# Patient Record
Sex: MALE | Race: WHITE | NOT HISPANIC OR LATINO | Employment: FULL TIME | URBAN - METROPOLITAN AREA
[De-identification: names, ages, dates, MRNs, and addresses within clinical notes are randomized per-mention and may not be internally consistent; named-entity substitution may affect disease eponyms.]

---

## 2017-04-20 RX ORDER — LISINOPRIL AND HYDROCHLOROTHIAZIDE 20; 25 MG/1; MG/1
1 TABLET ORAL DAILY
COMMUNITY
End: 2017-04-21 | Stop reason: SDUPTHER

## 2017-04-20 RX ORDER — ASPIRIN 81 MG/1
81 TABLET ORAL DAILY
COMMUNITY
End: 2018-04-24 | Stop reason: SDUPTHER

## 2017-04-20 RX ORDER — CITALOPRAM 20 MG/1
20 TABLET, FILM COATED ORAL DAILY
COMMUNITY
End: 2017-04-21 | Stop reason: SDUPTHER

## 2017-04-20 RX ORDER — TESTOSTERONE CYPIONATE 200 MG/ML
120 INJECTION, SOLUTION INTRAMUSCULAR WEEKLY
COMMUNITY
End: 2017-04-21 | Stop reason: HOSPADM

## 2017-04-20 RX ORDER — ATORVASTATIN CALCIUM 40 MG/1
40 TABLET, FILM COATED ORAL NIGHTLY
COMMUNITY
End: 2017-04-21 | Stop reason: SDUPTHER

## 2017-04-20 RX ORDER — ACETAMINOPHEN 500 MG
1 TABLET ORAL DAILY
COMMUNITY
End: 2019-06-21

## 2017-04-20 RX ORDER — METFORMIN HYDROCHLORIDE 500 MG/1
500 TABLET, EXTENDED RELEASE ORAL NIGHTLY
COMMUNITY
End: 2017-04-21 | Stop reason: SDUPTHER

## 2017-04-20 RX ORDER — ATENOLOL AND CHLORTHALIDONE TABLET 50; 25 MG/1; MG/1
1 TABLET ORAL DAILY
COMMUNITY
End: 2017-04-21 | Stop reason: SDUPTHER

## 2017-04-20 RX ORDER — AMLODIPINE BESYLATE 10 MG/1
10 TABLET ORAL DAILY
COMMUNITY
End: 2017-04-21 | Stop reason: SDUPTHER

## 2017-04-21 ENCOUNTER — OFFICE VISIT (OUTPATIENT)
Dept: INTERNAL MEDICINE | Facility: CLINIC | Age: 62
End: 2017-04-21
Payer: COMMERCIAL

## 2017-04-21 ENCOUNTER — LAB VISIT (OUTPATIENT)
Dept: LAB | Facility: HOSPITAL | Age: 62
End: 2017-04-21
Attending: FAMILY MEDICINE
Payer: COMMERCIAL

## 2017-04-21 VITALS
DIASTOLIC BLOOD PRESSURE: 80 MMHG | OXYGEN SATURATION: 96 % | SYSTOLIC BLOOD PRESSURE: 122 MMHG | RESPIRATION RATE: 16 BRPM | BODY MASS INDEX: 34.77 KG/M2 | HEART RATE: 69 BPM | WEIGHT: 285.5 LBS | HEIGHT: 76 IN | TEMPERATURE: 97 F

## 2017-04-21 DIAGNOSIS — Z12.11 SCREENING FOR COLORECTAL CANCER: ICD-10-CM

## 2017-04-21 DIAGNOSIS — E78.2 MIXED DYSLIPIDEMIA: Chronic | ICD-10-CM

## 2017-04-21 DIAGNOSIS — Z11.59 ENCOUNTER FOR HEPATITIS C SCREENING TEST FOR LOW RISK PATIENT: ICD-10-CM

## 2017-04-21 DIAGNOSIS — Z23 NEED FOR SHINGLES VACCINE: ICD-10-CM

## 2017-04-21 DIAGNOSIS — I86.1 SCROTAL VARICES: Chronic | ICD-10-CM

## 2017-04-21 DIAGNOSIS — Z12.12 SCREENING FOR COLORECTAL CANCER: ICD-10-CM

## 2017-04-21 DIAGNOSIS — I10 ESSENTIAL HYPERTENSION, BENIGN: Chronic | ICD-10-CM

## 2017-04-21 DIAGNOSIS — Z23 NEED FOR TDAP VACCINATION: ICD-10-CM

## 2017-04-21 DIAGNOSIS — E66.09: Chronic | ICD-10-CM

## 2017-04-21 DIAGNOSIS — Z12.5 SCREENING FOR PROSTATE CANCER: ICD-10-CM

## 2017-04-21 DIAGNOSIS — Z12.5 SCREENING FOR PROSTATE CANCER: Primary | ICD-10-CM

## 2017-04-21 DIAGNOSIS — R35.1 BPH ASSOCIATED WITH NOCTURIA: Chronic | ICD-10-CM

## 2017-04-21 DIAGNOSIS — F33.41 RECURRENT MAJOR DEPRESSION IN PARTIAL REMISSION: Chronic | ICD-10-CM

## 2017-04-21 DIAGNOSIS — N40.1 BPH ASSOCIATED WITH NOCTURIA: Chronic | ICD-10-CM

## 2017-04-21 DIAGNOSIS — Z79.899 ENCOUNTER FOR MEDICATION MANAGEMENT: Chronic | ICD-10-CM

## 2017-04-21 DIAGNOSIS — R73.03 PREDIABETES: Chronic | ICD-10-CM

## 2017-04-21 DIAGNOSIS — R73.01 IMPAIRED FASTING GLUCOSE: Chronic | ICD-10-CM

## 2017-04-21 LAB
ALBUMIN SERPL BCP-MCNC: 3.9 G/DL
ALP SERPL-CCNC: 61 U/L
ALT SERPL W/O P-5'-P-CCNC: 47 U/L
ANION GAP SERPL CALC-SCNC: 8 MMOL/L
AST SERPL-CCNC: 27 U/L
BILIRUB SERPL-MCNC: 0.8 MG/DL
BUN SERPL-MCNC: 22 MG/DL
CALCIUM SERPL-MCNC: 9.1 MG/DL
CHLORIDE SERPL-SCNC: 103 MMOL/L
CO2 SERPL-SCNC: 29 MMOL/L
COMPLEXED PSA SERPL-MCNC: 0.38 NG/ML
CREAT SERPL-MCNC: 1.1 MG/DL
EST. GFR  (AFRICAN AMERICAN): >60 ML/MIN/1.73 M^2
EST. GFR  (NON AFRICAN AMERICAN): >60 ML/MIN/1.73 M^2
GLUCOSE SERPL-MCNC: 87 MG/DL
POTASSIUM SERPL-SCNC: 3.9 MMOL/L
PROT SERPL-MCNC: 7 G/DL
SODIUM SERPL-SCNC: 140 MMOL/L

## 2017-04-21 PROCEDURE — 3079F DIAST BP 80-89 MM HG: CPT | Mod: S$GLB,,, | Performed by: FAMILY MEDICINE

## 2017-04-21 PROCEDURE — 90715 TDAP VACCINE 7 YRS/> IM: CPT | Mod: S$GLB,,, | Performed by: FAMILY MEDICINE

## 2017-04-21 PROCEDURE — 99204 OFFICE O/P NEW MOD 45 MIN: CPT | Mod: 25,S$GLB,, | Performed by: FAMILY MEDICINE

## 2017-04-21 PROCEDURE — 90472 IMMUNIZATION ADMIN EACH ADD: CPT | Mod: S$GLB,,, | Performed by: FAMILY MEDICINE

## 2017-04-21 PROCEDURE — 1160F RVW MEDS BY RX/DR IN RCRD: CPT | Mod: S$GLB,,, | Performed by: FAMILY MEDICINE

## 2017-04-21 PROCEDURE — 90736 HZV VACCINE LIVE SUBQ: CPT | Mod: S$GLB,,, | Performed by: FAMILY MEDICINE

## 2017-04-21 PROCEDURE — 90471 IMMUNIZATION ADMIN: CPT | Mod: S$GLB,,, | Performed by: FAMILY MEDICINE

## 2017-04-21 PROCEDURE — 86803 HEPATITIS C AB TEST: CPT

## 2017-04-21 PROCEDURE — 36415 COLL VENOUS BLD VENIPUNCTURE: CPT | Mod: PO

## 2017-04-21 PROCEDURE — 3074F SYST BP LT 130 MM HG: CPT | Mod: S$GLB,,, | Performed by: FAMILY MEDICINE

## 2017-04-21 PROCEDURE — 99999 PR PBB SHADOW E&M-EST. PATIENT-LVL IV: CPT | Mod: PBBFAC,,, | Performed by: FAMILY MEDICINE

## 2017-04-21 PROCEDURE — 84153 ASSAY OF PSA TOTAL: CPT

## 2017-04-21 PROCEDURE — 80053 COMPREHEN METABOLIC PANEL: CPT

## 2017-04-21 RX ORDER — METFORMIN HYDROCHLORIDE 500 MG/1
500 TABLET, EXTENDED RELEASE ORAL NIGHTLY
Qty: 90 TABLET | Refills: 3 | Status: SHIPPED | OUTPATIENT
Start: 2017-04-21 | End: 2018-04-24 | Stop reason: SDUPTHER

## 2017-04-21 RX ORDER — CITALOPRAM 20 MG/1
20 TABLET, FILM COATED ORAL DAILY
Qty: 90 TABLET | Refills: 3 | Status: SHIPPED | OUTPATIENT
Start: 2017-04-21 | End: 2018-04-24 | Stop reason: SDUPTHER

## 2017-04-21 RX ORDER — ATORVASTATIN CALCIUM 40 MG/1
40 TABLET, FILM COATED ORAL NIGHTLY
Qty: 90 TABLET | Refills: 3 | Status: SHIPPED | OUTPATIENT
Start: 2017-04-21 | End: 2018-04-24 | Stop reason: SDUPTHER

## 2017-04-21 RX ORDER — LISINOPRIL AND HYDROCHLOROTHIAZIDE 20; 25 MG/1; MG/1
1 TABLET ORAL DAILY
Qty: 90 TABLET | Refills: 3 | Status: SHIPPED | OUTPATIENT
Start: 2017-04-21 | End: 2018-04-24 | Stop reason: SDUPTHER

## 2017-04-21 RX ORDER — ATENOLOL AND CHLORTHALIDONE TABLET 50; 25 MG/1; MG/1
1 TABLET ORAL DAILY
Qty: 90 TABLET | Refills: 3 | Status: SHIPPED | OUTPATIENT
Start: 2017-04-21 | End: 2018-04-24 | Stop reason: ALTCHOICE

## 2017-04-21 RX ORDER — AMLODIPINE BESYLATE 10 MG/1
10 TABLET ORAL DAILY
Qty: 90 TABLET | Refills: 3 | Status: SHIPPED | OUTPATIENT
Start: 2017-04-21 | End: 2018-04-24 | Stop reason: SDUPTHER

## 2017-04-21 NOTE — MR AVS SNAPSHOT
[facility]  [address] [facility] [address] [place] [address]  Phone: [phone]  Fax: [phone]                  Kennedy Cervantes   2017 8:40 AM   Office Visit    Description:  Male : 1955   Provider:  Jayce Patterson MD   Department:  [facility]           Reason for Visit     Hypertension     Medication Refill           Diagnoses this Visit        Comments    Screening for prostate cancer    -  Primary     Essential hypertension, benign         Mixed dyslipidemia         Prediabetes         Impaired fasting glucose         Encounter for medication management         BPH associated with nocturia         Scrotal varices         Recurrent major depression in partial remission         Encounter for hepatitis C screening test for low risk patient         Need for shingles vaccine         Need for Tdap vaccination         Screening for colorectal cancer         Class 1 obesity due to excess calories with serious comorbidity and body mass index (BMI) 34.0 to 34.9 in adult                To Do List           Future Appointments        Provider Department Dept Phone    2017 11:55 AM LABORATORY, [facility] [phone]    2017 3:00 PM MD [facility] [phone]    10/13/2017 8:00 AM Jayce Patterson MD [facility] [phone]      Goals (5 Years of Data)     None      Follow-Up and Disposition     Return in about 6 months (around 10/21/2017).       These Medications        Disp Refills Start End    amlodipine (NORVASC) 10 MG tablet 90 tablet 3 2017     Take 1 tablet (10 mg total) by mouth once daily. - Oral    Pharmacy: [id number] - [place] - [address]  [facility] Ph #: [phone]       atenolol-chlorthalidone (TENORETIC) 50-25 mg Tab 90 tablet 3 2017     Take 1 tablet by mouth once daily. - Oral    Pharmacy: [facility]  [address] [place] [address] [facility] AT [facility] Ph #: [phone]       atorvastatin (LIPITOR) 40 MG tablet 90 tablet 3 4/21/2017     Take 1 tablet (40 mg total) by mouth nightly. - Oral    Pharmacy: [address] [address] [facility] AT [facility] Ph #: [phone]       citalopram (CELEXA) 20 MG tablet 90 tablet 3 4/21/2017     Take 1 tablet (20 mg total) by mouth once daily. - Oral    Pharmacy: [address] [address] [facility] AT [facility] Ph #: [phone]       lisinopril-hydrochlorothiazide (PRINZIDE,ZESTORETIC) 20-25 mg Tab 90 tablet 3 4/21/2017     Take 1 tablet by mouth once daily. - Oral    Pharmacy: [address] [address] [facility] AT [facility] Ph #: [phone]       metformin (GLUCOPHAGE-XR) 500 MG 24 hr tablet 90 tablet 3 4/21/2017     Take 1 tablet (500 mg total) by mouth every evening. - Oral    Pharmacy: [address] [address] [facility] AT [facility] Ph #: [phone]         [facility]     [facility] Nurse Care Line - 24/7 Assistance  Unless otherwise directed by your provider, please contact [facility], our nurse care line that is available for 24/7 assistance.     Registered nurses in the [facility] provide: appointment scheduling, clinical advisement, health education, and other advisory services.  Call: [phone] (toll free)               Medications           Message regarding Medications     Verify the changes and/or additions to your medication regime listed below are the same as discussed with your clinician today.  If any of these changes or additions are incorrect, please notify your healthcare provider.        CHANGE how you are taking these medications     Start Taking Instead of    amlodipine (NORVASC) 10 MG  tablet amlodipine (NORVASC) 10 MG tablet    Dosage:  Take 1 tablet (10 mg total) by mouth once daily. Dosage:  Take 10 mg by mouth once daily.    Reason for Change:  Reorder     atorvastatin (LIPITOR) 40 MG tablet atorvastatin (LIPITOR) 40 MG tablet    Dosage:  Take 1 tablet (40 mg total) by mouth nightly. Dosage:  Take 40 mg by mouth nightly.    Reason for Change:  Reorder     citalopram (CELEXA) 20 MG tablet citalopram (CELEXA) 20 MG tablet    Dosage:  Take 1 tablet (20 mg total) by mouth once daily. Dosage:  Take 20 mg by mouth once daily.    Reason for Change:  Reorder     metformin (GLUCOPHAGE-XR) 500 MG 24 hr tablet metformin (GLUCOPHAGE-XR) 500 MG 24 hr tablet    Dosage:  Take 1 tablet (500 mg total) by mouth every evening. Dosage:  Take 500 mg by mouth every evening.     Reason for Change:  Reorder       STOP taking these medications     testosterone cypionate (DEPOTESTOTERONE CYPIONATE) 200 mg/mL injection Inject 120 mg into the muscle once a week.           Verify that the below list of medications is an accurate representation of the medications you are currently taking.  If none reported, the list may be blank. If incorrect, please contact your healthcare provider. Carry this list with you in case of emergency.           Current Medications     amlodipine (NORVASC) 10 MG tablet Take 1 tablet (10 mg total) by mouth once daily.    aspirin (ECOTRIN) 81 MG EC tablet Take 81 mg by mouth once daily.    atenolol-chlorthalidone (TENORETIC) 50-25 mg Tab Take 1 tablet by mouth once daily.    atorvastatin (LIPITOR) 40 MG tablet Take 1 tablet (40 mg total) by mouth nightly.    cholecalciferol, vitamin D3, (VITAMIN D3) 2,000 unit Cap Take 1 capsule by mouth once daily.    citalopram (CELEXA) 20 MG tablet Take 1 tablet (20 mg total) by mouth once daily.    lisinopril-hydrochlorothiazide (PRINZIDE,ZESTORETIC) 20-25 mg Tab Take 1 tablet by mouth once daily.    metformin (GLUCOPHAGE-XR) 500 MG 24 hr tablet Take 1 tablet  "(500 mg total) by mouth every evening.    MV,MINERALS/FA/LYCOPENE/GINKGO (DAILY MULTIPLE FOR MEN 50+ ORAL) Take 1 tablet by mouth once daily.    omega-3 fatty acids-vitamin E (FISH OIL) 1,000 mg Cap Take 1 capsule by mouth once daily.           Clinical Reference Information           Your Vitals Were     BP Pulse Temp Resp Height Weight    122/80 69 97.3 °F (36.3 °C) (Tympanic) 16 6' 4.3" (1.938 m) 129.5 kg (285 lb 7.9 oz)    SpO2 BMI             96% 34.48 kg/m2         Blood Pressure          Most Recent Value    BP  122/80      Allergies as of 4/21/2017     No Known Allergies      Immunizations Administered on Date of Encounter - 4/21/2017     Name Date Dose VIS Date Route    TDAP  Incomplete 0.5 mL 2/24/2015 Intramuscular    Zoster  Incomplete 0.65 mL 10/6/2009 Subcutaneous      Orders Placed During Today's Visit      Normal Orders This Visit    Case request GI: COLONOSCOPY     Tdap Vaccine     Zoster Vaccine - Live     Future Labs/Procedures Expected by Expires    Comprehensive metabolic panel  4/21/2017 6/20/2018    Hepatitis C antibody  4/21/2017 6/20/2018    PSA, Screening  4/21/2017 6/20/2018      [facility] Sign-Up     Activating your [facility] account is as easy as 1-2-3!     1) Visit [email or web address], select Sign Up Now, enter this activation code and your date of birth, then select Next.  [email or web address]  Expires: 6/5/2017  9:30 AM      2) Create a username and password to use when you visit [facility] in the future and select a security question in case you lose your password and select Next.    3) Enter your e-mail address and click Sign Up!    Additional Information  If you have questions, please e-mail [email or web address] or call [phone] to talk to our [facility] staff. Remember, [facility] is NOT to be used for urgent needs. For medical emergencies, dial 911.         Language Assistance Services     ATTENTION: Language assistance services are available, free of charge. Please call " [phone].      ATENCIÓN: Si habla español, tiene a escamilla disposición servicios gratuitos de asistencia lingüística. Llame al [phone].     CHÚ Ý: N?u b?n nói Ti?ng Vi?t, có các d?ch v? h? tr? ngôn ng? mi?n phí dành cho b?n. G?i s? [phone].         [facility] complies with applicable Federal civil rights laws and does not discriminate on the basis of race, color, national origin, age, disability, or sex.

## 2017-04-21 NOTE — ASSESSMENT & PLAN NOTE
He appears to be tolerating his current medications well and reports preceiving no adverse side-effects.

## 2017-04-21 NOTE — ASSESSMENT & PLAN NOTE
Based on the report of the patient and information available to me at present, this condition appears to be STABLE/COMPENSATED.

## 2017-04-21 NOTE — ASSESSMENT & PLAN NOTE
This condition appears MILD to MODERATE, with minimal WORSENING over the last couple of years area and he has follow-up with his urologist within the next couple of months.

## 2017-04-21 NOTE — ASSESSMENT & PLAN NOTE
Appears to be tolerating statin for dyslipidemia without apparent myositis or hepatic dysfunction.

## 2017-04-21 NOTE — PROGRESS NOTES
NOTE: Documentation entered by me for this encounter was done in part using speech-recognition technology. Although I have made an effort to ensure accuracy, malapropisms may exist and should be interpreted in context. LEODAN Patterson MD    Patient ID: Kennedy Cervantes is a 61 y.o. male.    CHIEF COMPLAINT   Hypertension and Medication Refill        HISTORY OF PRESENT ILLNESS:   Essential hypertension, benign  Based on the report of the patient and information available to me at present, this condition appears to be WELL CONTROLLED, and this condition appears to be STABLE.     Mixed dyslipidemia  Appears to be tolerating statin for dyslipidemia without apparent myositis or hepatic dysfunction.    Prediabetes  This appears clinically asymptomatic. He is tolerating metformin without adverse side effect.    Encounter for medication management  He appears to be tolerating his current medications well and reports preceiving no adverse side-effects.     BPH associated with nocturia  This condition appears MILD to MODERATE, with minimal WORSENING over the last couple of years area and he has follow-up with his urologist within the next couple of months.    Scrotal varices  Based on the report of the patient and information available to me at present, this condition appears to be STABLE/COMPENSATED.     Recurrent major depression in partial remission  Based on the report of the patient and information available to me at present, this condition appears to be WELL CONTROLLED, and this condition appears to be STABLE.     Class 1 obesity due to excess calories with serious comorbidity and body mass index (BMI) 34.0 to 34.9 in adult  He has made sincere efforts at therapeutic lifestyle changes and has lost weight intentionally.        REVIEW OF SYSTEMS:   CONSTITUTIONAL: No fever reported.  CARDIOVASCULAR: No chest pain reported.  PULMONARY: No trouble breathing reported.    PHYSICAL EXAM:   CONSTITUTIONAL: Vital signs  noted. No apparent distress. Does not appear acutely ill or septic. Appears adequately hydrated.  HEENT: Normocephalic. Atraumatic. External ears unremarkable. Oropharynx moist.  PULM: Lungs clear. Breathing unlabored.  CV: Heart regular.  DERM: Warm and moist.  PSYCHIATRIC: Alert and oriented x 3. Mood is grossly neutral. Affect appropriate. Judgment and insight not grossly compromised.    ASSESSMENT:       1. Screening for prostate cancer    2. Essential hypertension, benign    3. Mixed dyslipidemia    4. Prediabetes    5. Impaired fasting glucose    6. Encounter for medication management    7. BPH associated with nocturia    8. Scrotal varices    9. Recurrent major depression in partial remission    10. Encounter for hepatitis C screening test for low risk patient    11. Need for shingles vaccine    12. Need for Tdap vaccination    13. Screening for colorectal cancer    14. Class 1 obesity due to excess calories with serious comorbidity and body mass index (BMI) 34.0 to 34.9 in adult             PLAN:   Screening for prostate cancer  -     PSA, Screening; Future; Expected date: 4/21/17    Essential hypertension, benign  -     Comprehensive metabolic panel; Future; Expected date: 4/21/17  -     amlodipine (NORVASC) 10 MG tablet; Take 1 tablet (10 mg total) by mouth once daily.  Dispense: 90 tablet; Refill: 3  -     atenolol-chlorthalidone (TENORETIC) 50-25 mg Tab; Take 1 tablet by mouth once daily.  Dispense: 90 tablet; Refill: 3  -     lisinopril-hydrochlorothiazide (PRINZIDE,ZESTORETIC) 20-25 mg Tab; Take 1 tablet by mouth once daily.  Dispense: 90 tablet; Refill: 3    Mixed dyslipidemia  -     atorvastatin (LIPITOR) 40 MG tablet; Take 1 tablet (40 mg total) by mouth nightly.  Dispense: 90 tablet; Refill: 3    Prediabetes  -     metformin (GLUCOPHAGE-XR) 500 MG 24 hr tablet; Take 1 tablet (500 mg total) by mouth every evening.  Dispense: 90 tablet; Refill: 3    Impaired fasting glucose    Encounter for  medication management  -     Comprehensive metabolic panel; Future; Expected date: 4/21/17    BPH associated with nocturia  -     PSA, Screening; Future; Expected date: 4/21/17    Scrotal varices    Recurrent major depression in partial remission  -     citalopram (CELEXA) 20 MG tablet; Take 1 tablet (20 mg total) by mouth once daily.  Dispense: 90 tablet; Refill: 3    Encounter for hepatitis C screening test for low risk patient  -     Hepatitis C antibody; Future; Expected date: 4/21/17    Need for shingles vaccine  -     Zoster Vaccine - Live    Need for Tdap vaccination  -     Tdap Vaccine    Screening for colorectal cancer  -     Case request GI: COLONOSCOPY    Class 1 obesity due to excess calories with serious comorbidity and body mass index (BMI) 34.0 to 34.9 in adult        Medications Discontinued During This Encounter   Medication Reason    testosterone cypionate (DEPOTESTOTERONE CYPIONATE) 200 mg/mL injection Patient Discharge    amlodipine (NORVASC) 10 MG tablet Reorder    atenolol-chlorthalidone (TENORETIC) 50-25 mg Tab Reorder    atorvastatin (LIPITOR) 40 MG tablet Reorder    citalopram (CELEXA) 20 MG tablet Reorder    lisinopril-hydrochlorothiazide (PRINZIDE,ZESTORETIC) 20-25 mg Tab Reorder    metformin (GLUCOPHAGE-XR) 500 MG 24 hr tablet Reorder

## 2017-04-25 LAB — HCV AB SERPL QL IA: NEGATIVE

## 2017-05-23 ENCOUNTER — OFFICE VISIT (OUTPATIENT)
Dept: UROLOGY | Facility: CLINIC | Age: 62
End: 2017-05-23
Payer: COMMERCIAL

## 2017-05-23 VITALS — BODY MASS INDEX: 34.42 KG/M2 | WEIGHT: 285 LBS

## 2017-05-23 DIAGNOSIS — Z12.5 PROSTATE CANCER SCREENING: Primary | ICD-10-CM

## 2017-05-23 DIAGNOSIS — N50.89 TESTICULAR MASS: ICD-10-CM

## 2017-05-23 LAB
BILIRUB SERPL-MCNC: NORMAL MG/DL
BLOOD URINE, POC: NORMAL
COLOR, POC UA: NORMAL
GLUCOSE UR QL STRIP: NORMAL
KETONES UR QL STRIP: NORMAL
LEUKOCYTE ESTERASE URINE, POC: NORMAL
NITRITE, POC UA: NORMAL
PH, POC UA: 6
PROTEIN, POC: NORMAL
SPECIFIC GRAVITY, POC UA: 1.01
UROBILINOGEN, POC UA: NORMAL

## 2017-05-23 PROCEDURE — 99244 OFF/OP CNSLTJ NEW/EST MOD 40: CPT | Mod: 25,S$GLB,, | Performed by: UROLOGY

## 2017-05-23 PROCEDURE — 81002 URINALYSIS NONAUTO W/O SCOPE: CPT | Mod: S$GLB,,, | Performed by: UROLOGY

## 2017-05-23 PROCEDURE — 99999 PR PBB SHADOW E&M-EST. PATIENT-LVL II: CPT | Mod: PBBFAC,,, | Performed by: UROLOGY

## 2017-05-23 NOTE — LETTER
May 23, 2017      VEE Patterson MD  [address] [facility] [address]           [facility]  [address] [place] [address]  Phone: [phone]  Fax: [phone]          Patient: Kennedy Cervantes   MR Number: [id number]   YOB: 1955   Date of Visit: 5/23/2017       Dear Dr. VEE Patterson:    Thank you for referring Kennedy Cervantes to me for evaluation. Attached you will find relevant portions of my assessment and plan of care.    If you have questions, please do not hesitate to call me. I look forward to following Kennedy Cervantes along with you.    Sincerely,    Boni Caballero IV, MD    Enclosure  CC:  No Recipients    If you would like to receive this communication electronically, please contact [email or web address] or [phone] to request more information on [facility] access.    For providers and/or their staff who would like to refer a patient to [facility], please contact us through our one-stop-shop provider referral line, [facility] , at [phone].    If you feel you have received this communication in error or would no longer like to receive these types of communications, please e-mail [email or web address]

## 2017-05-23 NOTE — PROGRESS NOTES
Chief Complaint: Peyronie's Disease    HPI:   5/23/17: 62 yo man a few months ago had the veins on his penis swell up, had a lot of tenderness that lasted 2-3 weeks and left is penis with a mild crook to the left but maybe that's the same as ever.  None of this was assoiciated with sexual activity.  No abd/pelvic pain and no exac/rel factors.  No hematuria.  No urolithiasis.  No sig urinary bother.  Mild nocturia. Stream a little weak.   No recent  history.  Normal sexual function.  Things pretty normal now.  Referred by Dr. Patterson.     Allergies:  Review of patient's allergies indicates no known allergies.    Medications:  has a current medication list which includes the following prescription(s): amlodipine, aspirin, atenolol-chlorthalidone, atorvastatin, cholecalciferol (vitamin d3), citalopram, lisinopril-hydrochlorothiazide, metformin, mv,minerals/fa/lycopene/ginkgo, and omega-3 fatty acids-vitamin e.    Review of Systems:  General: No fever, chills, fatigability, or weight loss.  Skin: No rashes, itching, or changes in color or texture of skin.  Chest: Denies WHITAKER, cyanosis, wheezing, cough, and sputum production.  Abdomen: Appetite fine. No weight loss. Denies diarrhea, abdominal pain, hematemesis, or blood in stool.  Musculoskeletal: No joint stiffness or swelling. Denies back pain.  : As above.  All other review of systems negative.    PMH:   has a past medical history of Depression; Enlarged prostate without lower urinary tract symptoms (luts); Hyperlipidemia; Hypertension; Impaired fasting glucose; Obesity; Scrotal varices; and Testicular hypofunction.    PSH:   has a past surgical history that includes Spinal cord decompression and Colonoscopy.    FamHx: family history includes Dementia in his mother; Hypothyroidism in his mother.    SocHx:  reports that he has quit smoking. His smoking use included Cigarettes. He has a 10.00 pack-year smoking history. He has never used smokeless tobacco. He  reports that he drinks alcohol. He reports that he does not use drugs.      Physical Exam:  There were no vitals filed for this visit.  General: A&Ox3, no apparent distress, no deformities  Neck: No masses, normal thyroid  Lungs: normal inspiration, no use of accessory muscles  Heart: normal pulse, no arrhythmias  Abdomen: Soft, NT, ND, no masses, no hernias, no hepatosplenomegaly  Lymphatic: Neck and groin nodes negative  Skin: The skin is warm and dry. No jaundice.  Ext: No c/c/e.  : Test desc yaquelin, no abnormalities of epididymus on left. Right large testicle with mass that does not transilluminate. Penis normal, with normal penile and scrotal skin except for a very prominent 2 cm area of varicose veins right side of penis. Meatus normal. Normal rectal tone, no hemorrhoids. Prost 30 gm no nodules or masses appreciated. SV not palpable. Perineum and anus normal.    Labs/Studies:   Urinalysis performed in clinic, summary: UA normal  PSA    4/17: 0.38    Impression/Plan:   1. Low risk of prostate cancer.    2. Scrotal US to eval right testicle.  He says it was checked out 15 years ago and was benign then.  Hopefully it still is.   3. Penile veins have a sig varicosity - likely had a clot in one that cleared similar to a hemorrhoid flareup.  Conservative management.

## 2017-05-30 ENCOUNTER — TELEPHONE (OUTPATIENT)
Dept: RADIOLOGY | Facility: HOSPITAL | Age: 62
End: 2017-05-30

## 2017-05-31 ENCOUNTER — HOSPITAL ENCOUNTER (OUTPATIENT)
Dept: RADIOLOGY | Facility: HOSPITAL | Age: 62
Discharge: HOME | End: 2017-05-31
Attending: UROLOGY
Payer: COMMERCIAL

## 2017-05-31 ENCOUNTER — EXTERNAL RECORD (OUTPATIENT)
Dept: OTHER | Age: 62
End: 2017-05-31

## 2017-05-31 DIAGNOSIS — Z12.5 PROSTATE CANCER SCREENING: ICD-10-CM

## 2017-05-31 DIAGNOSIS — N50.89 TESTICULAR MASS: ICD-10-CM

## 2017-05-31 PROCEDURE — 76870 US EXAM SCROTUM: CPT | Mod: 26,,, | Performed by: RADIOLOGY

## 2017-05-31 PROCEDURE — 76870 US EXAM SCROTUM: CPT | Mod: TC,PO

## 2017-06-26 ENCOUNTER — OFFICE VISIT (OUTPATIENT)
Dept: UROLOGY | Facility: CLINIC | Age: 62
End: 2017-06-26
Payer: COMMERCIAL

## 2017-06-26 VITALS
SYSTOLIC BLOOD PRESSURE: 138 MMHG | DIASTOLIC BLOOD PRESSURE: 92 MMHG | HEART RATE: 68 BPM | WEIGHT: 289 LBS | BODY MASS INDEX: 34.9 KG/M2

## 2017-06-26 DIAGNOSIS — Z12.5 PROSTATE CANCER SCREENING: Primary | ICD-10-CM

## 2017-06-26 DIAGNOSIS — I86.1 VARICOSE VEIN OF SCROTUM: ICD-10-CM

## 2017-06-26 LAB
BILIRUB SERPL-MCNC: NORMAL MG/DL
BLOOD URINE, POC: NORMAL
COLOR, POC UA: YELLOW
GLUCOSE UR QL STRIP: NORMAL
KETONES UR QL STRIP: NORMAL
LEUKOCYTE ESTERASE URINE, POC: NORMAL
NITRITE, POC UA: NORMAL
PH, POC UA: 7
PROTEIN, POC: NORMAL
SPECIFIC GRAVITY, POC UA: 1
UROBILINOGEN, POC UA: NORMAL

## 2017-06-26 PROCEDURE — 81002 URINALYSIS NONAUTO W/O SCOPE: CPT | Mod: S$GLB,,, | Performed by: UROLOGY

## 2017-06-26 PROCEDURE — 99999 PR PBB SHADOW E&M-EST. PATIENT-LVL II: CPT | Mod: PBBFAC,,, | Performed by: UROLOGY

## 2017-06-26 PROCEDURE — 99214 OFFICE O/P EST MOD 30 MIN: CPT | Mod: 25,S$GLB,, | Performed by: UROLOGY

## 2017-06-26 NOTE — PROGRESS NOTES
Chief Complaint: Peyronie's Disease    HPI:   6/26/17: doing well reviewed history.  Scrotal US reassuring.  5/23/17: 60 yo man a few months ago had the veins on his penis swell up, had a lot of tenderness that lasted 2-3 weeks and left is penis with a mild crook to the left but maybe that's the same as ever.  None of this was assoiciated with sexual activity.  No abd/pelvic pain and no exac/rel factors.  No hematuria.  No urolithiasis.  No sig urinary bother.  Mild nocturia. Stream a little weak.   No recent  history.  Normal sexual function.  Things pretty normal now.  Referred by Dr. Patterson.     Allergies:  Review of patient's allergies indicates no known allergies.    Medications:  has a current medication list which includes the following prescription(s): amlodipine, aspirin, atenolol-chlorthalidone, atorvastatin, cholecalciferol (vitamin d3), citalopram, lisinopril-hydrochlorothiazide, metformin, mv-mins/folic/lycopene/ginkgo, and omega-3 fatty acids-vitamin e.    Review of Systems:  General: No fever, chills, fatigability, or weight loss.  Skin: No rashes, itching, or changes in color or texture of skin.  Chest: Denies WHITAKER, cyanosis, wheezing, cough, and sputum production.  Abdomen: Appetite fine. No weight loss. Denies diarrhea, abdominal pain, hematemesis, or blood in stool.  Musculoskeletal: No joint stiffness or swelling. Denies back pain.  : As above.  All other review of systems negative.    PMH:   has a past medical history of Depression; Enlarged prostate without lower urinary tract symptoms (luts); Hyperlipidemia; Hypertension; Impaired fasting glucose; Obesity; Scrotal varices; and Testicular hypofunction.    PSH:   has a past surgical history that includes Spinal cord decompression; Colonoscopy; and anterior laminectomy.    FamHx: family history includes Dementia in his mother; Hypothyroidism in his mother.    SocHx:  reports that he has quit smoking. His smoking use included Cigarettes. He  has a 10.00 pack-year smoking history. He has never used smokeless tobacco. He reports that he drinks alcohol. He reports that he does not use drugs.      Physical Exam:  Vitals:    06/26/17 0903   BP: (!) 138/92   Pulse: 68     General: A&Ox3, no apparent distress, no deformities  Neck: No masses, normal thyroid  Lungs: normal inspiration, no use of accessory muscles  Heart: normal pulse, no arrhythmias  Abdomen: Soft, NT, ND, no masses, no hernias, no hepatosplenomegaly  Lymphatic: Neck and groin nodes negative  Skin: The skin is warm and dry. No jaundice.  Ext: No c/c/e.  :   5/23/17: Test desc yaquelin, no abnormalities of epididymus on left. Right large testicle with mass that does not transilluminate. Penis normal, with normal penile and scrotal skin except for a very prominent 2 cm area of varicose veins right side of penis. Meatus normal. Normal rectal tone, no hemorrhoids. Prost 30 gm no nodules or masses appreciated. SV not palpable. Perineum and anus normal.    Labs/Studies:   Urinalysis performed in clinic, summary: UA normal  PSA    4/17: 0.38    Impression/Plan:   1. Low risk of prostate cancer.    2. Scrotal US to eval right testicle normal.  He says it was checked out 15 years ago and was benign then.  It still is.   3. Penile veins have a sig varicosity - likely had a clot in one that cleared similar to a hemorrhoid flareup.  Conservative management.

## 2017-08-18 ENCOUNTER — TELEPHONE (OUTPATIENT)
Dept: INTERNAL MEDICINE | Facility: CLINIC | Age: 62
End: 2017-08-18

## 2017-10-02 ENCOUNTER — PATIENT OUTREACH (OUTPATIENT)
Dept: ADMINISTRATIVE | Facility: HOSPITAL | Age: 62
End: 2017-10-02

## 2017-10-02 NOTE — LETTER
October 2, 2017    Kennedy Cervantes  [address] [facility] [address]             [facility]  [address]  [facility] [address]  Phone: [phone] Dear Mr. Cervantes:    [facility] is committed to your overall health.  To help you get the most out of each of your visits, we will review your information to make sure you are up to date on all of your recommended tests and/or procedures.      RODERICK Patterson MD has found that you may be due for    Colonoscopy     Influenza Vaccine         If you have had any of the above done at another facility, please bring the records or information with you so that your record at [facility] will be complete.    If you are currently taking medication, please bring it with you to your appointment for review.    We will be happy to assist you with scheduling any necessary appointments or you may contact the [facility] appointment desk at [phone] to schedule at your convenience.     Thank you for choosing [facility] for your healthcare needs,      NEY Perez  Care Coordination Department  [facility]  [phone]

## 2018-02-16 ENCOUNTER — DOCUMENTATION ONLY (OUTPATIENT)
Dept: ENDOSCOPY | Facility: HOSPITAL | Age: 63
End: 2018-02-16

## 2018-04-12 DIAGNOSIS — R73.03 PREDIABETES: Chronic | ICD-10-CM

## 2018-04-12 DIAGNOSIS — E78.2 MIXED DYSLIPIDEMIA: Chronic | ICD-10-CM

## 2018-04-12 DIAGNOSIS — I10 ESSENTIAL HYPERTENSION, BENIGN: Chronic | ICD-10-CM

## 2018-04-16 RX ORDER — ATORVASTATIN CALCIUM 40 MG/1
TABLET, FILM COATED ORAL
Qty: 90 TABLET | Refills: 0 | OUTPATIENT
Start: 2018-04-16

## 2018-04-16 RX ORDER — AMLODIPINE BESYLATE 10 MG/1
TABLET ORAL
Qty: 90 TABLET | Refills: 0 | OUTPATIENT
Start: 2018-04-16

## 2018-04-16 RX ORDER — ATENOLOL AND CHLORTHALIDONE TABLET 50; 25 MG/1; MG/1
TABLET ORAL
Qty: 90 TABLET | Refills: 0 | OUTPATIENT
Start: 2018-04-16

## 2018-04-16 RX ORDER — LISINOPRIL AND HYDROCHLOROTHIAZIDE 20; 25 MG/1; MG/1
TABLET ORAL
Qty: 90 TABLET | Refills: 0 | OUTPATIENT
Start: 2018-04-16

## 2018-04-16 RX ORDER — METFORMIN HYDROCHLORIDE 500 MG/1
TABLET, EXTENDED RELEASE ORAL
Qty: 90 TABLET | Refills: 0 | OUTPATIENT
Start: 2018-04-16

## 2018-04-24 ENCOUNTER — OFFICE VISIT (OUTPATIENT)
Dept: INTERNAL MEDICINE | Facility: CLINIC | Age: 63
End: 2018-04-24
Payer: COMMERCIAL

## 2018-04-24 ENCOUNTER — LAB VISIT (OUTPATIENT)
Dept: LAB | Facility: HOSPITAL | Age: 63
End: 2018-04-24
Attending: FAMILY MEDICINE
Payer: COMMERCIAL

## 2018-04-24 VITALS
TEMPERATURE: 97 F | DIASTOLIC BLOOD PRESSURE: 88 MMHG | WEIGHT: 294.13 LBS | SYSTOLIC BLOOD PRESSURE: 138 MMHG | BODY MASS INDEX: 35.82 KG/M2 | HEIGHT: 76 IN

## 2018-04-24 DIAGNOSIS — R73.03 PREDIABETES: Chronic | ICD-10-CM

## 2018-04-24 DIAGNOSIS — R73.09 ABNORMAL GLUCOSE: ICD-10-CM

## 2018-04-24 DIAGNOSIS — R35.1 BPH ASSOCIATED WITH NOCTURIA: Chronic | ICD-10-CM

## 2018-04-24 DIAGNOSIS — E78.2 MIXED DYSLIPIDEMIA: Chronic | ICD-10-CM

## 2018-04-24 DIAGNOSIS — I10 ESSENTIAL HYPERTENSION, BENIGN: Chronic | ICD-10-CM

## 2018-04-24 DIAGNOSIS — N40.1 BPH ASSOCIATED WITH NOCTURIA: Chronic | ICD-10-CM

## 2018-04-24 DIAGNOSIS — F33.41 RECURRENT MAJOR DEPRESSION IN PARTIAL REMISSION: Chronic | ICD-10-CM

## 2018-04-24 DIAGNOSIS — I10 ESSENTIAL HYPERTENSION, BENIGN: Primary | Chronic | ICD-10-CM

## 2018-04-24 DIAGNOSIS — R73.01 IMPAIRED FASTING GLUCOSE: Chronic | ICD-10-CM

## 2018-04-24 DIAGNOSIS — H90.3 SENSORINEURAL HEARING LOSS (SNHL) OF BOTH EARS: Chronic | ICD-10-CM

## 2018-04-24 DIAGNOSIS — Z12.12 SCREENING FOR COLORECTAL CANCER: ICD-10-CM

## 2018-04-24 DIAGNOSIS — Z12.11 SCREENING FOR COLORECTAL CANCER: ICD-10-CM

## 2018-04-24 PROBLEM — E66.01 CLASS 2 SEVERE OBESITY DUE TO EXCESS CALORIES WITH SERIOUS COMORBIDITY AND BODY MASS INDEX (BMI) OF 35.0 TO 35.9 IN ADULT: Chronic | Status: ACTIVE | Noted: 2017-04-21

## 2018-04-24 LAB
ALBUMIN SERPL BCP-MCNC: 4.1 G/DL
ALP SERPL-CCNC: 75 U/L
ALT SERPL W/O P-5'-P-CCNC: 34 U/L
ANION GAP SERPL CALC-SCNC: 11 MMOL/L
AST SERPL-CCNC: 24 U/L
BILIRUB SERPL-MCNC: 0.9 MG/DL
BUN SERPL-MCNC: 14 MG/DL
CALCIUM SERPL-MCNC: 9.4 MG/DL
CHLORIDE SERPL-SCNC: 99 MMOL/L
CHOLEST SERPL-MCNC: 196 MG/DL
CHOLEST/HDLC SERPL: 3.4 {RATIO}
CO2 SERPL-SCNC: 28 MMOL/L
CREAT SERPL-MCNC: 1.1 MG/DL
EST. GFR  (AFRICAN AMERICAN): >60 ML/MIN/1.73 M^2
EST. GFR  (NON AFRICAN AMERICAN): >60 ML/MIN/1.73 M^2
ESTIMATED AVG GLUCOSE: 105 MG/DL
GLUCOSE SERPL-MCNC: 94 MG/DL
HBA1C MFR BLD HPLC: 5.3 %
HDLC SERPL-MCNC: 57 MG/DL
HDLC SERPL: 29.1 %
LDLC SERPL CALC-MCNC: 117.4 MG/DL
NONHDLC SERPL-MCNC: 139 MG/DL
POTASSIUM SERPL-SCNC: 4 MMOL/L
PROT SERPL-MCNC: 7.5 G/DL
SODIUM SERPL-SCNC: 138 MMOL/L
TRIGL SERPL-MCNC: 108 MG/DL

## 2018-04-24 PROCEDURE — 80061 LIPID PANEL: CPT

## 2018-04-24 PROCEDURE — 36415 COLL VENOUS BLD VENIPUNCTURE: CPT | Mod: PO

## 2018-04-24 PROCEDURE — 83036 HEMOGLOBIN GLYCOSYLATED A1C: CPT

## 2018-04-24 PROCEDURE — 99999 PR PBB SHADOW E&M-EST. PATIENT-LVL III: CPT | Mod: PBBFAC,,, | Performed by: FAMILY MEDICINE

## 2018-04-24 PROCEDURE — 99214 OFFICE O/P EST MOD 30 MIN: CPT | Mod: S$GLB,,, | Performed by: FAMILY MEDICINE

## 2018-04-24 PROCEDURE — 80053 COMPREHEN METABOLIC PANEL: CPT

## 2018-04-24 RX ORDER — SODIUM, POTASSIUM,MAG SULFATES 17.5-3.13G
SOLUTION, RECONSTITUTED, ORAL ORAL
Qty: 354 ML | Refills: 0 | Status: ON HOLD | OUTPATIENT
Start: 2018-04-24 | End: 2018-06-07 | Stop reason: CLARIF

## 2018-04-24 RX ORDER — AMLODIPINE BESYLATE 10 MG/1
10 TABLET ORAL DAILY
Qty: 90 TABLET | Refills: 3 | Status: SHIPPED | OUTPATIENT
Start: 2018-04-24 | End: 2018-10-30 | Stop reason: SDUPTHER

## 2018-04-24 RX ORDER — ASPIRIN 81 MG/1
81 TABLET ORAL DAILY
Qty: 90 TABLET | Refills: 3 | Status: SHIPPED | OUTPATIENT
Start: 2018-04-24 | End: 2019-06-21

## 2018-04-24 RX ORDER — CITALOPRAM 20 MG/1
20 TABLET, FILM COATED ORAL DAILY
Qty: 90 TABLET | Refills: 3 | Status: SHIPPED | OUTPATIENT
Start: 2018-04-24 | End: 2019-02-04 | Stop reason: SDUPTHER

## 2018-04-24 RX ORDER — LISINOPRIL AND HYDROCHLOROTHIAZIDE 20; 25 MG/1; MG/1
1 TABLET ORAL DAILY
Qty: 90 TABLET | Refills: 3 | Status: SHIPPED | OUTPATIENT
Start: 2018-04-24 | End: 2018-05-25 | Stop reason: DRUGHIGH

## 2018-04-24 RX ORDER — METOPROLOL SUCCINATE 100 MG/1
100 TABLET, EXTENDED RELEASE ORAL DAILY
Qty: 90 TABLET | Refills: 3 | Status: SHIPPED | OUTPATIENT
Start: 2018-04-24 | End: 2018-10-30 | Stop reason: SDUPTHER

## 2018-04-24 RX ORDER — CITALOPRAM 20 MG/1
TABLET, FILM COATED ORAL
Qty: 90 TABLET | Refills: 0 | Status: CANCELLED | OUTPATIENT
Start: 2018-04-24

## 2018-04-24 RX ORDER — METFORMIN HYDROCHLORIDE 500 MG/1
500 TABLET, EXTENDED RELEASE ORAL NIGHTLY
Qty: 90 TABLET | Refills: 3 | Status: SHIPPED | OUTPATIENT
Start: 2018-04-24 | End: 2019-03-28 | Stop reason: SDUPTHER

## 2018-04-24 RX ORDER — ATORVASTATIN CALCIUM 40 MG/1
40 TABLET, FILM COATED ORAL NIGHTLY
Qty: 90 TABLET | Refills: 3 | Status: SHIPPED | OUTPATIENT
Start: 2018-04-24 | End: 2019-03-28 | Stop reason: SDUPTHER

## 2018-04-24 NOTE — PROGRESS NOTES
Patient answers are not available for this visit.    CHIEF COMPLAINT  Hypertension      HISTORY OF PRESENT ILLNESS      PROBLEM/CONDITION: Hypertension is asymptomatic, but with suboptimal control. We discussed risks and benefits of treatment options. In addition to therapeutic lifestyle changes, it was agreed to switch him from atenolol/chlorthalidone to more selective beta-blocker metoprolol and enroll him in the digital hypertension monitoring program. Ab is good.    PROBLEM/CONDITION: He is tolerating metformin for his impaired fasting glucose/prediabetes.    PROBLEM/CONDITION: Depression appears well-controlled. No raj, hypomania, or suicidal ideations reported.    PROBLEM/CONDITION: He follows with urologist Dr. Caballero for his BPH, which appears stable.    PROBLEM/CONDITION: NEW PROBLEM is bilateral hearing loss, gradual ONSET over the last few years, worse with high frequencies and with difficulty distinguishing voices in noisy environments.    No other complaints or concerns reported.        Problem List Items Addressed This Visit     Abnormal glucose    Relevant Orders    HEMOGLOBIN A1C    Essential hypertension, benign - Primary (Chronic)    Relevant Medications    metoprolol succinate (TOPROL-XL) 100 MG 24 hr tablet    amLODIPine (NORVASC) 10 MG tablet    aspirin (ECOTRIN) 81 MG EC tablet    lisinopril-hydrochlorothiazide (PRINZIDE,ZESTORETIC) 20-25 mg Tab    Other Relevant Orders    COMPREHENSIVE METABOLIC PANEL    [facility] (Completed[facility]    Hypertension Digital Medicine ([facility]): Assign Onboarding Questionnaires (Completed)    Mixed dyslipidemia (Chronic)    Relevant Medications    atorvastatin (LIPITOR) 40 MG tablet    Other Relevant Orders    COMPREHENSIVE METABOLIC PANEL    LIPID PANEL    Prediabetes (Chronic)    Relevant Medications    metFORMIN (GLUCOPHAGE-XR) 500 MG 24 hr tablet    Other Relevant Orders    COMPREHENSIVE METABOLIC PANEL    LIPID  PANEL    HEMOGLOBIN A1C    Impaired fasting glucose (Chronic)    Relevant Orders    HEMOGLOBIN A1C    BPH associated with nocturia (Chronic)    Recurrent major depression in partial remission (Chronic)    Relevant Medications    citalopram (CELEXA) 20 MG tablet    Sensorineural hearing loss (SNHL) of both ears (Chronic)    Relevant Orders    Ambulatory Referral to Audiology      Other Visit Diagnoses     Screening for colorectal cancer        Relevant Medications    sodium,potassium,mag sulfates (SUPREP BOWEL PREP KIT) 17.5-3.13-1.6 gram SolR    Other Relevant Orders    Case request GI: COLONOSCOPY (Completed)          PAST MEDICAL HISTORY, FAMILY HISTORY and SOCIAL HISTORY reviewed by me (VEE Patterson MD) and are updated consistent with the patient's report.    CURRENT MEDICATION LIST, and ALLERGY LIST reviewed by me (VEE Patterson MD) and are updated consistent with the patient's report as follows:  Outpatient Prescriptions Marked as Taking for the 4/24/18 encounter (Office Visit) with VEE Patterson MD   Medication Sig Dispense Refill    amLODIPine (NORVASC) 10 MG tablet Take 1 tablet (10 mg total) by mouth once daily. 90 tablet 3    aspirin (ECOTRIN) 81 MG EC tablet Take 1 tablet (81 mg total) by mouth once daily. 90 tablet 3    atorvastatin (LIPITOR) 40 MG tablet Take 1 tablet (40 mg total) by mouth nightly. 90 tablet 3    citalopram (CELEXA) 20 MG tablet Take 1 tablet (20 mg total) by mouth once daily. 90 tablet 3    lisinopril-hydrochlorothiazide (PRINZIDE,ZESTORETIC) 20-25 mg Tab Take 1 tablet by mouth once daily. 90 tablet 3    metFORMIN (GLUCOPHAGE-XR) 500 MG 24 hr tablet Take 1 tablet (500 mg total) by mouth every evening. 90 tablet 3    MV,MINERALS/FA/LYCOPENE/GINKGO (DAILY MULTIPLE FOR MEN 50+ ORAL) Take 1 tablet by mouth once daily.      omega-3 fatty acids-vitamin E (FISH OIL) 1,000 mg Cap Take 1 capsule by mouth once daily.      [DISCONTINUED] amlodipine (NORVASC) 10 MG tablet  "Take 1 tablet (10 mg total) by mouth once daily. 90 tablet 3    [DISCONTINUED] aspirin (ECOTRIN) 81 MG EC tablet Take 81 mg by mouth once daily.      [DISCONTINUED] atenolol-chlorthalidone (TENORETIC) 50-25 mg Tab Take 1 tablet by mouth once daily. 90 tablet 3    [DISCONTINUED] atorvastatin (LIPITOR) 40 MG tablet Take 1 tablet (40 mg total) by mouth nightly. 90 tablet 3    [DISCONTINUED] citalopram (CELEXA) 20 MG tablet Take 1 tablet (20 mg total) by mouth once daily. 90 tablet 3    [DISCONTINUED] lisinopril-hydrochlorothiazide (PRINZIDE,ZESTORETIC) 20-25 mg Tab Take 1 tablet by mouth once daily. 90 tablet 3    [DISCONTINUED] metformin (GLUCOPHAGE-XR) 500 MG 24 hr tablet Take 1 tablet (500 mg total) by mouth every evening. 90 tablet 3      Allergies as of 04/24/2018    (No Known Allergies)       REVIEW OF SYSTEMS  CONSTITUTIONAL: No fever reported.  CARDIOVASCULAR: No chest pain reported.  PULMONARY: No trouble breathing reported.     PHYSICAL EXAM  Vitals:    04/24/18 0956   BP: 138/88   BP Location: Right arm   Patient Position: Sitting   BP Method: Large (Manual)   Temp: 97.4 °F (36.3 °C)   TempSrc: Tympanic   Weight: 133.4 kg (294 lb 1.5 oz)   Height: 6' 4.3" (1.938 m)     CONSTITUTIONAL: Vital signs noted. No apparent distress. Does not appear acutely ill or septic. Appears adequately hydrated.  EYE: Sclerae anicteric. Lids and conjunctiva unremarkable.  ENT: External ENT unremarkable. Oropharynx moist. Lips and tongue unremarkable. Ear canals and visualized tympanic membranes are unremarkable. Hearing grossly fair.  NECK: Trachea midline. Thyroid nontender.  LYMPH: No cervical or supraclavicular lymphadenopathy.  PULM: Lungs clear. Breathing unlabored.  CV: Auscultation reveals regular rate and rhythm without murmur, gallop or rub. No carotid bruit.  GI: Soft and nontender. Bowel sounds normal. No appreciable organomegaly or abdominal mass on palpation. Abdominal aorta nonpalpable. No abdominal " bruit.  DERM: Skin warm and moist with normal turgor.  NEURO: There are no gross focal motor deficits or gross deficits of cranial nerves III-XII.  PSYCH: Alert and oriented x 3. Mood is grossly neutral. Affect appropriate. Judgment and insight not grossly compromised.  MSK: Grossly normal stance and gait.     ASSESSMENT and PLAN  Essential hypertension, benign  -     metoprolol succinate (TOPROL-XL) 100 MG 24 hr tablet; Take 1 tablet (100 mg total) by mouth once daily.  Dispense: 90 tablet; Refill: 3  -     amLODIPine (NORVASC) 10 MG tablet; Take 1 tablet (10 mg total) by mouth once daily.  Dispense: 90 tablet; Refill: 3  -     aspirin (ECOTRIN) 81 MG EC tablet; Take 1 tablet (81 mg total) by mouth once daily.  Dispense: 90 tablet; Refill: 3  -     lisinopril-hydrochlorothiazide (PRINZIDE,ZESTORETIC) 20-25 mg Tab; Take 1 tablet by mouth once daily.  Dispense: 90 tablet; Refill: 3  -     COMPREHENSIVE METABOLIC PANEL; Future; Expected date: 04/24/2018  -     Hypertension DineroMail Medicine ([facility]) Enrollment Order  -     Hypertension Digital Medicine ([facility]): Assign Onboarding Questionnaires    Mixed dyslipidemia  -     atorvastatin (LIPITOR) 40 MG tablet; Take 1 tablet (40 mg total) by mouth nightly.  Dispense: 90 tablet; Refill: 3  -     COMPREHENSIVE METABOLIC PANEL; Future; Expected date: 04/24/2018  -     LIPID PANEL; Future; Expected date: 04/24/2018    Recurrent major depression in partial remission  -     citalopram (CELEXA) 20 MG tablet; Take 1 tablet (20 mg total) by mouth once daily.  Dispense: 90 tablet; Refill: 3    Prediabetes  -     metFORMIN (GLUCOPHAGE-XR) 500 MG 24 hr tablet; Take 1 tablet (500 mg total) by mouth every evening.  Dispense: 90 tablet; Refill: 3  -     COMPREHENSIVE METABOLIC PANEL; Future; Expected date: 04/24/2018  -     LIPID PANEL; Future; Expected date: 04/24/2018  -     HEMOGLOBIN A1C; Future; Expected date: 04/24/2018    BPH associated with nocturia    Impaired fasting glucose  -      HEMOGLOBIN A1C; Future; Expected date: 04/24/2018    Abnormal glucose  -     HEMOGLOBIN A1C; Future; Expected date: 04/24/2018    Sensorineural hearing loss (SNHL) of both ears  -     Ambulatory Referral to Audiology    Screening for colorectal cancer  -     sodium,potassium,mag sulfates (SUPREP BOWEL PREP KIT) 17.5-3.13-1.6 gram SolR; Use as directed  Dispense: 354 mL; Refill: 0  -     Case request GI: COLONOSCOPY        PRESCRIPTION MEDICATION MANAGEMENT  Except as noted below, CURRENT MEDICATIONS are to remain unchanged from that listed above.    Medications Ordered This Encounter      amLODIPine (NORVASC) 10 MG tablet          Sig: Take 1 tablet (10 mg total) by mouth once daily.          Dispense:  90 tablet          Refill:  3      aspirin (ECOTRIN) 81 MG EC tablet          Sig: Take 1 tablet (81 mg total) by mouth once daily.          Dispense:  90 tablet          Refill:  3          Pharmacist, please help him choose quality generic OTC for this. Thank you!      atorvastatin (LIPITOR) 40 MG tablet          Sig: Take 1 tablet (40 mg total) by mouth nightly.          Dispense:  90 tablet          Refill:  3      citalopram (CELEXA) 20 MG tablet          Sig: Take 1 tablet (20 mg total) by mouth once daily.          Dispense:  90 tablet          Refill:  3      lisinopril-hydrochlorothiazide (PRINZIDE,ZESTORETIC) 20-25 mg Tab          Sig: Take 1 tablet by mouth once daily.          Dispense:  90 tablet          Refill:  3      metFORMIN (GLUCOPHAGE-XR) 500 MG 24 hr tablet          Sig: Take 1 tablet (500 mg total) by mouth every evening.          Dispense:  90 tablet          Refill:  3      metoprolol succinate (TOPROL-XL) 100 MG 24 hr tablet          Sig: Take 1 tablet (100 mg total) by mouth once daily.          Dispense:  90 tablet          Refill:  3          IMPORTANT!  This REPLACES atenolol-chlorthalidone. DISCONTINUE any existing prescription for atenolol-chlorthalidone.      sodium,potassium,mag  "sulfates (SUPREP BOWEL PREP KIT) 17.5-3.13-1.6 gram SolR          Sig: Use as directed          Dispense:  354 mL          Refill:  0  Medications Discontinued During This Encounter   Medication Reason    atenolol-chlorthalidone (TENORETIC) 50-25 mg Tab Alternate therapy    amlodipine (NORVASC) 10 MG tablet Reorder    aspirin (ECOTRIN) 81 MG EC tablet Reorder    atorvastatin (LIPITOR) 40 MG tablet Reorder    citalopram (CELEXA) 20 MG tablet Reorder    lisinopril-hydrochlorothiazide (PRINZIDE,ZESTORETIC) 20-25 mg Tab Reorder    metformin (GLUCOPHAGE-XR) 500 MG 24 hr tablet Reorder       Follow-up in about 11 months (around 3/24/2019) for wellness and preventive services.    ABOUT THIS DOCUMENTATION:  · The order of the conditions listed in the HPI is one of convenience and does not necessarily reflect the chronology of the appointment, nor the relative importance of a condition. It is possible that additional description or status details about condition(s) may be found elsewhere in the EHR documentation for today's encounter.  · Documentation entered by me for this encounter was done in part using speech-recognition technology. Although I have made an effort to ensure accuracy, "sound like" errors may exist and should be interpreted in context.                        -VEE Patterson MD    There are no Patient Instructions on file for this visit.      "

## 2018-04-25 ENCOUNTER — CLINICAL SUPPORT (OUTPATIENT)
Dept: AUDIOLOGY | Facility: CLINIC | Age: 63
End: 2018-04-25
Payer: COMMERCIAL

## 2018-04-25 ENCOUNTER — PATIENT MESSAGE (OUTPATIENT)
Dept: ADMINISTRATIVE | Facility: OTHER | Age: 63
End: 2018-04-25

## 2018-04-25 DIAGNOSIS — H90.5 HEARING LOSS, SENSORINEURAL, COMBINED TYPES: Primary | ICD-10-CM

## 2018-04-25 PROCEDURE — 92567 TYMPANOMETRY: CPT | Mod: S$GLB,,, | Performed by: AUDIOLOGIST

## 2018-04-25 PROCEDURE — 92557 COMPREHENSIVE HEARING TEST: CPT | Mod: S$GLB,,, | Performed by: AUDIOLOGIST

## 2018-04-25 NOTE — PROGRESS NOTES
Kennedy Cervantes was seen 04/25/2018 for an audiological evaluation.  Patient complains of bilateral gradual hearing loss. He reports noise exposure history.    Results reveal a mild-to-moderately severe sensorineural hearing loss 1166-0775 Hz for the right ear, and  A moderate to Moderately severe sensorineural hearing loss 5026-4295 Hz for the left ear.   Speech Reception Thresholds were  20 dBHL for the right ear and 20 dBHL for the left ear.   Word recognition scores were excellent for the right ear and excellent for the left ear.   Tympanograms were Type A, normal for the right ear and Type A, normal for the left ear.    Patient was counseled on the above findings.  Medina Suarez     Recommendations include:    1.  ENT followup  2.  Hearing aid consult (information was given to patient at today's visit)  3.  Wear hearing protective devices around loud noise  4.  Annual audiograms

## 2018-04-27 ENCOUNTER — DOCUMENTATION ONLY (OUTPATIENT)
Dept: ENDOSCOPY | Facility: HOSPITAL | Age: 63
End: 2018-04-27

## 2018-05-02 ENCOUNTER — PATIENT MESSAGE (OUTPATIENT)
Dept: ADMINISTRATIVE | Facility: OTHER | Age: 63
End: 2018-05-02

## 2018-05-02 NOTE — PROGRESS NOTES
04/25/18 Per [employer], Person pressed touch tone key to speak with an endoscopy .  Pt has been scheduled for 06/07/18 for colonoscopy. Oral instructions given, mailed and sent via my chart.

## 2018-05-03 NOTE — PROGRESS NOTES
"TASKS:   (1) Please send him a Patient Portal Message using the Smart Phrase [id number].  (2) If he has not read the message within 1 week, then call him to share my message with him by phone.  (3) If you can't reach him by phone (personally, not voice-mail) after trying for 2 business days, then copy-and-past my message in a letter (correspondence) to him using the "[email or web address]" Smart Phrase, and print and mail the letter to him.     Thanks!  VEE Patterson MD  --------------------------------------------------------------------------------   PATIENT CONTACT INFORMATION  Home Phone      [phone]  Work Phone      Not on file.  [employer]          [phone]    --------------------------------------------------------------------------------   Kennedy's future appointments include:  6/19/2018  10:20 AM   CHELSEA PERAZA    [facility] [place]  7/5/2018   1:00 PM    Boni Caballero IV, MD   [facility] UROLOGY   [facility]  --------------------------------------------------------------------------------    Kennedy Adame.    I have reviewed the results of recent tests you had done.    The CMP (comprehensive metabolic panel) checks kidney function, liver function, sodium, potassium, glucose (sugar) and other aspects of your metabolism. Your CMP is NORMAL.    The hemoglobin A1c ("A1c") test is used to test for diabetes. Your hemoglobin A1c is NORMAL.    Your lipid (cholesterol) panel shows that your cholesterol levels are GOOD.    To learn more about your test results and what they mean, click on the "About this test" link from your [facility] account ([email or web address]) or from your [employer] smartphone liz. Also, we can discuss your results further when you return for your next appointment.    Thank you for letting me care for you. I look forward to seeing you again. Let me know if you have any questions in the meantime.    Wishing you health and happiness,    VEE Patterson, " MD  ----------------------------------------------------------------  UPCOMING APPOINTMENTS  ----------------------------------------------------------------  Your future appointments include:  6/19/2018  10:20 AM   CHELSEA PERAZA [place]  7/5/2018   1:00 PM    Boni Caballero IV, MD   ON UROLOGY    Medical [facility]    ----------------------------------------------------------------  TO SCHEDULE OR CHANGE AN APPOINTMENT  ----------------------------------------------------------------  *Login to your [facility] account at [email or web address]  *Use the [employer] liz on your mobile device   or  *Call our appointment desk at [phone].    ----------------------------------------------------------------  ADDITIONAL IMPORTANT INFORMATION  ----------------------------------------------------------------  *MyChart is NOT to be used for urgent needs.  *Although we try to respond to messages within 2 business days, a response can take longer, depending on circumstances.  *For medical emergencies, dial 911.    You can get help with [facility] and [facility] by email at  [email or web address] or by phone at [phone]. The [facility] Patient Support Team is available Monday through Friday from 9 a.m. until 5 p.m.  (After hours, you can leave a message requesting assistance.)

## 2018-05-08 ENCOUNTER — PATIENT OUTREACH (OUTPATIENT)
Dept: OTHER | Facility: OTHER | Age: 63
End: 2018-05-08

## 2018-05-08 DIAGNOSIS — I10 ESSENTIAL HYPERTENSION, BENIGN: Primary | Chronic | ICD-10-CM

## 2018-05-08 NOTE — PROGRESS NOTES
Last 5 Patient Entered Readings                                      Current 30 Day Average: 153/100     Recent Readings 5/5/2018 5/5/2018 5/5/2018 5/3/2018 5/2/2018    SBP (mmHg) 166 152 152 153 140    DBP (mmHg) 94 92 92 100 105    Pulse 86 74 74 75 58        Mr. Kennedy Cervantes is a 62 y.o. male who is newly enrolled in the [facility] Medicine Hypertension Clinic.     The following information was reviewed/updated:  Pref[facility] pharmacy   [facility] [address] [place] [address] [address] [facility] AT [address] & [facility]  [address] [facility] [address]  Phone: [phone] Fax: [phone]    Patient prefers a 90 days supply  Review of patient's allergies indicates:  No Known Allergies  Current Outpatient Prescriptions on File Prior to Visit   Medication Sig Dispense Refill    amLODIPine (NORVASC) 10 MG tablet Take 1 tablet (10 mg total) by mouth once daily. 90 tablet 3    aspirin (ECOTRIN) 81 MG EC tablet Take 1 tablet (81 mg total) by mouth once daily. 90 tablet 3    atorvastatin (LIPITOR) 40 MG tablet Take 1 tablet (40 mg total) by mouth nightly. 90 tablet 3    cholecalciferol, vitamin D3, (VITAMIN D3) 2,000 unit Cap Take 1 capsule by mouth once daily.      citalopram (CELEXA) 20 MG tablet Take 1 tablet (20 mg total) by mouth once daily. 90 tablet 3    lisinopril-hydrochlorothiazide (PRINZIDE,ZESTORETIC) 20-25 mg Tab Take 1 tablet by mouth once daily. 90 tablet 3    metFORMIN (GLUCOPHAGE-XR) 500 MG 24 hr tablet Take 1 tablet (500 mg total) by mouth every evening. 90 tablet 3    metoprolol succinate (TOPROL-XL) 100 MG 24 hr tablet Take 1 tablet (100 mg total) by mouth once daily. 90 tablet 3    MV,MINERALS/FA/LYCOPENE/GINKGO (DAILY MULTIPLE FOR MEN 50+ ORAL) Take 1 tablet by mouth once daily.      omega-3 fatty acids-vitamin E (FISH OIL) 1,000 mg Cap Take 1 capsule by mouth once daily.      sodium,potassium,mag sulfates (SUPREP BOWEL PREP KIT) 17.5-3.13-1.6 gram SolR Use as  directed 354 mL 0     No current facility-administered medications on file prior to visit.      IF WALLY screen positive    WALLY screening results for this patient suggest a high likelihood of sleep apnea, which can contribute to hypertension. Patient has not been previously diagnosed with sleep apnea and is not interested in referral at this time.      RODERICK Patterson MD indicated he would like to see patient / have patient  referred to a specialist for further evaluation.     Reviewed non-pharmacologic therapies and impact on BP:    1. Low-sodium diet- 11 mmHg reduction 2-4 weeks. I have reviewed [facility].A[facility] diet sodium restrictions (<2000mg/day). Patient does not cook with salt and he does not add salt to foods after cooking. His wife is on board to help him in this area.  2. Exercise- 7 mmHg reduction 4-12 weeks. I have recommended patient engage in exercise as tolerated at least 30 minutes 5x per week to improve cardiovascular health. Patient goes to the gym during lunch four times a week for forty minute intervals.   3. Alcohol intake- 3 mmHg reduction 4-12 weeks. I have discussed with patient the maximum recommended number of 2 drink(s) per day for men/women. Patient consumes 2 glasses of red wine a night.    Explained that we expect patient to obtain several blood pressures per week at random times of day.   Our goal is to get  BP to consistently below 130/80mmHg and make the process convenient so patient can avoid extra trips to the office. Getting your blood pressure below 130/80mmHg (definition of control) will reduce your risk for heart attack, kidney failure, stroke and death (as well as kidney failure, eye disease, & dementia).     Patient is not meeting the goal already.   When asked what the patient thinks is causing BP to be elevated, he states: diet and weight.    Instructed patient not to allow anyone else to use phone and BP cuff.   I'm not available for emergencies. Patient will call [facility] on-call  ([phone] or [phone]) or 911 if needed.     Discussed appropriate BP measuring technique:  Before taking your blood pressure  Find a quiet place. You will need to listen for your heartbeat.  Roll up the sleeve on your left arm or remove any tight-sleeved clothing, if needed. (It's best to take your blood pressure from your left arm if you are right-handed.You can use the other arm if you have been told by your health care provider to do so.)  Rest in a chair next to a table for 5 to 10 minutes. (Your left arm should rest comfortably at heart level.)  Sit up straight with your back against the chair, legs uncrossed and on the ground.  Rest your forearm on the table with the palm of your hand facing up.  You should not talk, read the newspaper, or watch television during this process.      Patient and I agreed that he will continue to monitor blood pressure and sodium intake, and continue to remain adherent to medications.     I will plan to follow-up with the patient in 2 weeks.   Emailed patient link to [facility] webpage and my contact information in case he has any questions.       Last 5 Patient Entered Readings                                      Current 30 Day Average: 153/100     Recent Readings 5/5/2018 5/5/2018 5/5/2018 5/3/2018 5/2/2018    SBP (mmHg) 166 152 152 153 140    DBP (mmHg) 94 92 92 100 105    Pulse 86 74 74 75 58      63 yo male with PMH of HTN, depression, BPH, prediabetes, HLD. Patient is working aggressively with his diet and exercise to improve his blood pressure. His wife is helping him achieve his lifestyle modifications by not using salt to his diet. He is indicated with WALLY however the patient did not want a referral to the sleep clinic at this time.    With his current readings and lack of BP control, I am increasing lisinopril to 40mg daily. He is currently taking lisinopril/HCTZ 20-25mg daily therefore I am adding lisinopril 20mg tablet to his regimen to equal 40mg daily  of lisinopril. He just received a 90 day supply of lisinopril/HCTZ.

## 2018-05-08 NOTE — LETTER
Esmer Torres, PharmD  [address] [place] [address]     Dear Kennedy Cervantes,    Welcome to the [facility] Hypertension Digital Medicine Program!         My name is Esmer Torres PharmD and I am your dedicated Digital Medicine clinician.  As an expert in medication management, I will help ensure that the medications you are taking continue to provide you with the intended benefits.      I am Bita Bagley and I will be your health  for the duration of the program.  My  job is to help you identify lifestyle changes to improve your blood pressure control.  We will talk about nutrition, exercise, and other ways that you may be able to adjust your current habits to better your health. Together, we will work to improve your overall health and encourage you to meet your goals for a healthier lifestyle.    What we expect from YOU:    You will need to take blood pressure readings multiple times a week and no less than one reading per week.   It is important that you take your measurements at different times during the day, when possible.     What you should expect from your Digital Medicine Care Team:   We will provide you with education about high blood pressure, including lifestyle changes that could help you to control your blood pressure.   We will review your weekly readings and provide you with monthly blood pressure progress reports after you have been in the program for more than 30 days.   We will send monthly progress reports on your blood pressure control to your physician so they can follow along with your progress as well.    You will be able to reach me by phone at [phone] or through your [facility] account by clicking my name under Care Team on the right side of the home screen.    I look forward to working with you to achieve your blood pressure goals!    Sincerely,    Esmer Torres PharmD  Your personal clinician    Please visit  [email or web address] to learn more about high blood pressure and what you can do lower your blood pressure.                                                                                           Kennedy Cervantes  [address] [place] [address]

## 2018-05-09 RX ORDER — LISINOPRIL 20 MG/1
20 TABLET ORAL DAILY
Qty: 30 TABLET | Refills: 3 | Status: SHIPPED | OUTPATIENT
Start: 2018-05-09 | End: 2018-05-25 | Stop reason: DRUGHIGH

## 2018-05-17 ENCOUNTER — PATIENT OUTREACH (OUTPATIENT)
Dept: OTHER | Facility: OTHER | Age: 63
End: 2018-05-17

## 2018-05-17 NOTE — PROGRESS NOTES
Digital Medicine: Health  Introduction    Introduced Mr. Kennedy Cervantes to Digital Medicine. Discussed health  role and recommended lifestyle modifications.    Lifestyle Assessment:  Current Dietary Habits(i.e. low sodium, food labels, dining out): Reports he has started with lifestyle changes by cutting out almost all processed/pre-packaged foods and focusing on increasing healthy options. States he has stopped adding salt to food and is monitoring sodium intake by having his wife prepare low sodium meals and reading food labels. States he feels confident in making this a lifestyle change as he's trying to emphasize alternative choices. Reviewed importance of balance in long term weight loss, as patient states he would like to lose around 50 lbs. States he would also like to reduce coffee consumption, as he currently drinks about 30 oz of coffee per day. Will follow up on sodium reduction and coffee at next call. Recommended he mix with decaf to help slowly titrate down.     Exercise: Reports he has set a goal to exercise 4x/week at the gym near his work, but that due to increased work responsibilities the past two weeks he has not been able to go.  Encouraged continued integration of movement and focus on this goal as work responsibilities may reduce once boss returns.     Alcohol/Tobacco: Deferred.     Medication Adherence: has been compliant with the medicaiton regimen.     Other Goals: Patient goal is to continue with sodium monitoring and integrating movement over next few weeks. No further questions/concerns at this time. Will follow up in a few weeks to assess progress.     Reviewed AHA/AACE recommendations:  Limit sodium intake to <2000mg/day  Perform 150 minutes of physical activity per week    Reviewed the importance of self-monitoring, medication adherence, and that the health  can be used as a resource for lifestyle modifications to help reduce or maintain a healthy lifestyle.  Reviewed  that the Digital Medicine team is not available for emergencies and instructed the patient to call 911 or [facility] ([phone] or [phone]) if one arises.

## 2018-05-23 ENCOUNTER — PATIENT OUTREACH (OUTPATIENT)
Dept: OTHER | Facility: OTHER | Age: 63
End: 2018-05-23

## 2018-05-23 DIAGNOSIS — I10 ESSENTIAL HYPERTENSION, BENIGN: Primary | Chronic | ICD-10-CM

## 2018-05-23 NOTE — PROGRESS NOTES
Last 5 Patient Entered Readings                                      Current 30 Day Average: 151/94     Recent Readings 5/20/2018 5/18/2018 5/16/2018 5/15/2018 5/13/2018    SBP (mmHg) 166 141 144 157 147    DBP (mmHg) 92 85 93 101 86    Pulse 69 79 69 56 71        LMFCB. Once reached will recommend changing to chlorthalidone and valsartan from lisinopril and HCTZ.    5/25: Patient's BP average is above goal of <130/80.     Patient denies s/s of hypotension (lightheadedness, dizziness, nausea, fatigue) associated with low readings. Instructed patient to inform me if this occurs, patient confirms understanding.      Patient denies s/s of hypertension (SOB, CP, severe headaches, changes in vision) associated with high readings. Instructed patient to go to the ED if BP > 180/110 and accompanied by hypertensive s/s, patient confirms understanding.    Will continue to monitor regularly. Will follow up in 2-3 weeks, sooner if BP begins to trend upward or downward.    Patient has my contact information and knows to call with any concerns or clinical changes.     Current HTN regimen:  Hypertension Medications             amLODIPine (NORVASC) 10 MG tablet Take 1 tablet (10 mg total) by mouth once daily.    lisinopril (PRINIVIL,ZESTRIL) 20 MG tablet Take 1 tablet (20 mg total) by mouth once daily. Take with lisinopril/HCTZ 20-25mg daily.    lisinopril-hydrochlorothiazide (PRINZIDE,ZESTORETIC) 20-25 mg Tab Take 1 tablet by mouth once daily.    metoprolol succinate (TOPROL-XL) 100 MG 24 hr tablet Take 1 tablet (100 mg total) by mouth once daily.      Patient was in [place] this week on a business trip. I am changing lisinopril-HCTZ to valsartan 160mg daily and chlorthalidone 25mg daily. He will have labs 6/19 with labs already scheduled. He will be out of town the week of June 11th and 25th. We discussed side effects and he will contact me if things develop.

## 2018-05-25 RX ORDER — VALSARTAN 160 MG/1
160 TABLET ORAL DAILY
Qty: 30 TABLET | Refills: 3 | Status: SHIPPED | OUTPATIENT
Start: 2018-05-25 | End: 2018-06-08 | Stop reason: SDUPTHER

## 2018-05-25 RX ORDER — CHLORTHALIDONE 25 MG/1
25 TABLET ORAL DAILY
Qty: 30 TABLET | Refills: 3 | Status: SHIPPED | OUTPATIENT
Start: 2018-05-25 | End: 2018-09-30 | Stop reason: SDUPTHER

## 2018-06-06 RX ORDER — LIDOCAINE HYDROCHLORIDE 10 MG/ML
1 INJECTION, SOLUTION EPIDURAL; INFILTRATION; INTRACAUDAL; PERINEURAL ONCE
Status: CANCELLED | OUTPATIENT
Start: 2018-06-06 | End: 2018-06-06

## 2018-06-07 ENCOUNTER — ANESTHESIA (OUTPATIENT)
Dept: ENDOSCOPY | Facility: HOSPITAL | Age: 63
End: 2018-06-07
Payer: COMMERCIAL

## 2018-06-07 ENCOUNTER — HOSPITAL ENCOUNTER (OUTPATIENT)
Facility: HOSPITAL | Age: 63
Discharge: HOME | End: 2018-06-07
Attending: INTERNAL MEDICINE | Admitting: INTERNAL MEDICINE
Payer: COMMERCIAL

## 2018-06-07 ENCOUNTER — ANESTHESIA EVENT (OUTPATIENT)
Dept: ENDOSCOPY | Facility: HOSPITAL | Age: 63
End: 2018-06-07
Payer: COMMERCIAL

## 2018-06-07 ENCOUNTER — SURGERY (OUTPATIENT)
Age: 63
End: 2018-06-07

## 2018-06-07 ENCOUNTER — EXTERNAL RECORD (OUTPATIENT)
Dept: OTHER | Age: 63
End: 2018-06-07

## 2018-06-07 VITALS
HEART RATE: 63 BPM | OXYGEN SATURATION: 97 % | DIASTOLIC BLOOD PRESSURE: 76 MMHG | HEIGHT: 77 IN | TEMPERATURE: 98 F | WEIGHT: 281 LBS | RESPIRATION RATE: 12 BRPM | BODY MASS INDEX: 33.18 KG/M2 | SYSTOLIC BLOOD PRESSURE: 129 MMHG

## 2018-06-07 DIAGNOSIS — K63.5 POLYP OF COLON, UNSPECIFIED PART OF COLON, UNSPECIFIED TYPE: Primary | ICD-10-CM

## 2018-06-07 DIAGNOSIS — Z12.11 COLON CANCER SCREENING: ICD-10-CM

## 2018-06-07 LAB
COLONOSCOPY STUDY: NORMAL
POCT GLUCOSE: 101 MG/DL (ref 70–110)

## 2018-06-07 PROCEDURE — 37000008 HC ANESTHESIA 1ST 15 MINUTES: Performed by: INTERNAL MEDICINE

## 2018-06-07 PROCEDURE — 88305 TISSUE EXAM BY PATHOLOGIST: CPT | Mod: 26,,, | Performed by: PATHOLOGY

## 2018-06-07 PROCEDURE — 25000003 PHARM REV CODE 250: Performed by: NURSE ANESTHETIST, CERTIFIED REGISTERED

## 2018-06-07 PROCEDURE — 63600175 PHARM REV CODE 636 W HCPCS: Performed by: NURSE ANESTHETIST, CERTIFIED REGISTERED

## 2018-06-07 PROCEDURE — 37000009 HC ANESTHESIA EA ADD 15 MINS: Performed by: INTERNAL MEDICINE

## 2018-06-07 PROCEDURE — 45380 COLONOSCOPY AND BIOPSY: CPT | Mod: 33,,, | Performed by: INTERNAL MEDICINE

## 2018-06-07 PROCEDURE — 82962 GLUCOSE BLOOD TEST: CPT | Performed by: INTERNAL MEDICINE

## 2018-06-07 PROCEDURE — 45380 COLONOSCOPY AND BIOPSY: CPT | Performed by: INTERNAL MEDICINE

## 2018-06-07 PROCEDURE — 25000003 PHARM REV CODE 250: Performed by: INTERNAL MEDICINE

## 2018-06-07 PROCEDURE — 88305 TISSUE EXAM BY PATHOLOGIST: CPT | Performed by: PATHOLOGY

## 2018-06-07 PROCEDURE — 27201012 HC FORCEPS, HOT/COLD, DISP: Performed by: INTERNAL MEDICINE

## 2018-06-07 RX ORDER — LIDOCAINE HYDROCHLORIDE 10 MG/ML
INJECTION INFILTRATION; PERINEURAL
Status: DISCONTINUED | OUTPATIENT
Start: 2018-06-07 | End: 2018-06-07

## 2018-06-07 RX ORDER — SODIUM CHLORIDE, SODIUM LACTATE, POTASSIUM CHLORIDE, CALCIUM CHLORIDE 600; 310; 30; 20 MG/100ML; MG/100ML; MG/100ML; MG/100ML
INJECTION, SOLUTION INTRAVENOUS CONTINUOUS
Status: DISCONTINUED | OUTPATIENT
Start: 2018-06-07 | End: 2018-06-07 | Stop reason: HOSPADM

## 2018-06-07 RX ORDER — PROPOFOL 10 MG/ML
VIAL (ML) INTRAVENOUS
Status: DISCONTINUED | OUTPATIENT
Start: 2018-06-07 | End: 2018-06-07

## 2018-06-07 RX ADMIN — PROPOFOL 50 MG: 10 INJECTION, EMULSION INTRAVENOUS at 07:06

## 2018-06-07 RX ADMIN — PROPOFOL 100 MG: 10 INJECTION, EMULSION INTRAVENOUS at 07:06

## 2018-06-07 RX ADMIN — SODIUM CHLORIDE, SODIUM LACTATE, POTASSIUM CHLORIDE, AND CALCIUM CHLORIDE: 600; 310; 30; 20 INJECTION, SOLUTION INTRAVENOUS at 07:06

## 2018-06-07 RX ADMIN — LIDOCAINE HYDROCHLORIDE 50 MG: 10 INJECTION, SOLUTION INFILTRATION; PERINEURAL at 07:06

## 2018-06-07 RX ADMIN — SODIUM CHLORIDE, SODIUM LACTATE, POTASSIUM CHLORIDE, AND CALCIUM CHLORIDE: 600; 310; 30; 20 INJECTION, SOLUTION INTRAVENOUS at 06:06

## 2018-06-07 NOTE — [employer]OVATION PATIENT INSTRUCTIONS
Discharge Summary/Instructions after an Endoscopic Procedure  Patient Name: Kennedy Cervantes  Patient MRN: [id number]  Patient YOB: 1955 Thursday, June 07, 2018 Deonte Duvall III, MD  RESTRICTIONS:  During your procedure today, you received medications for sedation.  These   medications may affect your judgment, balance and coordination.  Therefore,   for 24 hours, you have the following restrictions:   - DO NOT drive a car, operate machinery, make legal/financial decisions,   sign important papers or drink alcohol.    ACTIVITY:  The following day: return to full activity including work, except no heavy   lifting, straining or running for 3 days if polyps were removed.  DIET:  Eat and drink normally unless instructed otherwise.     TREATMENT FOR COMMON SIDE EFFECTS:  - Mild abdominal pain, nausea, belching, bloating or excessive gas:  rest,   eat lightly and use a heating pad.  - Sore Throat: treat with throat lozenges and/or gargle with warm salt   water.  - Because air was used during the procedure, expelling large amounts of air   from your rectum or belching is normal.  - If a bowel prep was taken, you may not have a bowel movement for 1-3 days.    This is normal.  SYMPTOMS TO WATCH FOR AND REPORT TO YOUR PHYSICIAN:  1. Abdominal pain or bloating, other than gas cramps.  2. Chest pain.  3. Back pain.  4. Signs of infection such as: chills or fever occurring within 24 hours   after the procedure.  5. Rectal bleeding, which would show as bright red, maroon, or black stools.   (A tablespoon of blood from the rectum is not serious, especially if   hemorrhoids are present.)  6. Vomiting.  7. Weakness or dizziness.  GO DIRECTLY TO THE NEAREST EMERGENCY ROOM IF YOU HAVE ANY OF THE FOLLOWING:      Difficulty breathing              Chills and/or fever over 101 F   Persistent vomiting and/or vomiting blood   Severe abdominal pain   Severe chest pain   Black, tarry stools   Bleeding- more than one  tablespoon   Any other symptom or condition that you feel may need urgent attention  Your doctor recommends these additional instructions:  If any biopsies were taken, your doctors clinic will contact you in 1 to 2   weeks with any results.  - Discharge patient to home (via wheelchair).   - High fiber diet.   - Continue present medications.   - Await pathology results.   - Repeat colonoscopy in 5 years for surveillance.   - Return to primary care physician as previously scheduled.   - Discharge patient to home (via wheelchair).   - High fiber diet.   - Continue present medications.   - Await pathology results.   - Repeat colonoscopy in 5 years for surveillance.   - Return to primary care physician as previously scheduled.  For questions, problems or results please call your physician Deonte Duvall III, MD at Work:  [phone]  If you have any questions about the above instructions, call the GI   department at [phone] or call the endoscopy unit at [phone]   from 7am until 3 pm.  [facility], EMERGENCY ROOM PHONE NUMBER:   [phone]  IF A COMPLICATION OR EMERGENCY SITUATION ARISES AND YOU ARE UNABLE TO REACH   YOUR PHYSICIAN - GO DIRECTLY TO THE EMERGENCY ROOM.  I have read or have had read to me these discharge instructions for my   procedure and have received a written copy.  I understand these   instructions and will follow-up with my physician if I have any questions.     __________________________________       _____________________________________  Nurse Signature                                          Patient/Designated   Responsible Party Signature  Deonte Duvall III, MD  6/7/2018 7:45:58 AM  This report has been verified and signed electronically.  [employer]

## 2018-06-07 NOTE — ANESTHESIA RELEASE NOTE
"Anesthesia Release from PACU Note    Patient: Kennedy Cervantes    Procedure(s) Performed: Procedure(s) (LRB):  COLONOSCOPY (N/A)    Anesthesia type: MAC    Post pain: Adequate analgesia    Post assessment: no apparent anesthetic complications    Last Vitals:   Visit Vitals  BP (!) 154/88 (BP Location: Left arm, Patient Position: Lying)   Pulse 68   Temp 36.7 °C (98 °F) (Oral)   Resp 20   Ht 6' 5" (1.956 m)   Wt 127.5 kg (281 lb)   SpO2 96%   BMI 33.32 kg/m²       Post vital signs: stable    Level of consciousness: awake    Nausea/Vomiting: no nausea/no vomiting    Complications: none    Airway Patency: patent    Respiratory: unassisted    Cardiovascular: stable and blood pressure at baseline    Hydration: euvolemic  "

## 2018-06-07 NOTE — ANESTHESIA PREPROCEDURE EVALUATION
06/07/2018  Kennedy Cervantes is a 62 y.o., male.    Anesthesia Evaluation    I have reviewed the Patient Summary Reports.    I have reviewed the Nursing Notes.   I have reviewed the Medications.     Review of Systems  Anesthesia Hx:  No problems with previous Anesthesia  History of prior surgery of interest to airway management or planning: Denies Family Hx of Anesthesia complications.   Denies Personal Hx of Anesthesia complications.   Social:  Former Smoker    Cardiovascular:   Hypertension hyperlipidemia    Pulmonary:  Pulmonary Normal    Renal/:  Renal/ Normal     Hepatic/GI:  Hepatic/GI Normal    Neurological:  Neurology Normal    Endocrine:   Diabetes    Psych:   Psychiatric History depression          Physical Exam  General:  Obesity    Airway/Jaw/Neck:  Airway Findings: Mouth Opening: Normal Tongue: Normal  General Airway Assessment: Adult  Mallampati: II  TM Distance: Normal, at least 6 cm       Chest/Lungs:  Chest/Lungs Findings: Normal Respiratory Rate     Heart/Vascular:  Heart Findings: Rate: Normal             Anesthesia Plan  Type of Anesthesia, risks & benefits discussed:  Anesthesia Type:  MAC  Patient's Preference:   Intra-op Monitoring Plan: standard ASA monitors  Intra-op Monitoring Plan Comments:   Post Op Pain Control Plan:   Post Op Pain Control Plan Comments:   Induction:   IV  Beta Blocker:  Patient is on a Beta-Blocker and has received one dose within the past 24 hours (No further documentation required).       Informed Consent: Patient understands risks and agrees with Anesthesia plan.  Questions answered. Anesthesia consent signed with patient.  ASA Score: 3     Day of Surgery Review of History & Physical:    H&P update referred to the surgeon.         Ready For Surgery From Anesthesia Perspective.

## 2018-06-07 NOTE — TRANSFER OF CARE
"Anesthesia Transfer of Care Note    Patient: Kennedy Cervantes    Procedure(s) Performed: Procedure(s) (LRB):  COLONOSCOPY (N/A)    Patient location: PACU    Anesthesia Type: MAC    Transport from OR: Transported from OR on room air with adequate spontaneous ventilation    Post pain: adequate analgesia    Post assessment: no apparent anesthetic complications    Post vital signs: stable    Level of consciousness: awake    Nausea/Vomiting: no nausea/vomiting    Complications: none    Transfer of care protocol was followed      Last vitals:   Visit Vitals  BP (!) 154/88 (BP Location: Left arm, Patient Position: Lying)   Pulse 68   Temp 36.7 °C (98 °F) (Oral)   Resp 20   Ht 6' 5" (1.956 m)   Wt 127.5 kg (281 lb)   SpO2 96%   BMI 33.32 kg/m²     "

## 2018-06-07 NOTE — DISCHARGE INSTRUCTIONS

## 2018-06-07 NOTE — H&P
Short Stay Endoscopy History and Physical    PCP - RODERICK Patterson MD    Procedure - Colonoscopy  ASA - 2  Mallampati - per anesthesia  History of Anesthesia problems - no  Family history Anesthesia problems -  no     HPI:  This is a 62 y.o.male here for evaluation of : Colon Cancer Screen    Reflux - no  Dysphagia - no  Abdominal pain - no  Diarrhea - no  Anemia - no  GI bleeding - no  Nausea and vomiting-no  Early satiety-no  aversion to sight or smell of food-no    ROS:  Constitutional: No fevers, chills, No weight loss  ENT: No allergies  CV: No chest pain  Pulm: No cough, No shortness of breath  Ophtho: No vision changes  GI: see HPI  Derm: No rash  Heme: No lymphadenopathy, No bruising  MSK: No arthritis  : No dysuria, No hematuria  Endo: No hot or cold intolerance  Neuro: No syncope, No seizure  Psych: No anxiety, No depression    Medical History:  Past Medical History:   Diagnosis Date    Class 2 obesity due to excess calories with serious comorbidity and body mass index (BMI) of 35.0 to 35.9 in adult 4/21/2017    Depression     Enlarged prostate without lower urinary tract symptoms (luts)     Hyperlipidemia     Hypertension     Impaired fasting glucose     Obesity     Scrotal varices     Sensorineural hearing loss (SNHL) of both ears 4/24/2018    Testicular hypofunction        Surgical History:  Past Surgical History:   Procedure Laterality Date    COLONOSCOPY      LAMINECTOMY      ANTERIOR    SPINAL CORD DECOMPRESSION      Cervical decompression with hardware placement       Family History:  Family History   Problem Relation Age of Onset    Hypothyroidism Mother     Dementia Mother        Social History:  Social History     Social History    Marital status:      Spouse name: N/A    Number of children: 3    Years of education: Bachelor's Degree     Occupational History    Not on file.     Social History Main Topics    Smoking status: Former Smoker     Packs/day: 1.00      Years: 10.00     Types: Cigarettes    Smokeless tobacco: Never Used      Comment: Cigarette smoker from approx. age 20s to 30s.    Alcohol use Yes      Comment: 2-3 times per week, 1 or 2 drinks at a time.    Drug use: No    Sexual activity: Yes     Partners: Female     Birth control/ protection: None     Other Topics Concern    Not on file     Social History Narrative    Full time employed; Former [employer] for 4 years,  with 3 children, Goes to gym 4-5 days per week.       Allergies: Review of patient's allergies indicates:  No Known Allergies    Medications:   No current facility-administered medications on file prior to encounter.      Current Outpatient Prescriptions on File Prior to Encounter   Medication Sig Dispense Refill    cholecalciferol, vitamin D3, (VITAMIN D3) 2,000 unit Cap Take 1 capsule by mouth once daily.      MV,MINERALS/FA/LYCOPENE/GINKGO (DAILY MULTIPLE FOR MEN 50+ ORAL) Take 1 tablet by mouth once daily.      omega-3 fatty acids-vitamin E (FISH OIL) 1,000 mg Cap Take 1 capsule by mouth once daily.         Objective Findings:    Vital Signs:There were no vitals filed for this visit.        Physical Exam:  General Appearance: Well appearing in no acute distress  Eyes:    No scleral icterus  ENT: Neck supple, Lips, mucosa, and tongue normal; teeth and gums normal  Lungs: CTA bilaterally in anterior and posterior fields, no wheezes, no crackles.  Heart:  Regular rate, S1, S2 normal, no murmurs heard.  Abdomen: Soft, non tender, non distended with normal bowel sounds. No hepatosplenomegaly, ascites, or mass.  Extremities: No clubbing, cyanosis or edema  Skin: No rash    Labs:  Reviewed    PLan: Colonoscopy  I have explained the risks and benefits of endoscopy procedures to the patient including but not limited to bleeding, perforation, infection, and death. The patient wishes to proceed.

## 2018-06-07 NOTE — DISCHARGE SUMMARY
[facility]  Brief Operative Note     SUMMARY     Surgery Date: 6/7/2018     Surgeon(s) and Role:     * Deonte Duvall III, MD - Primary    Assisting Surgeon: None    Pre-op Diagnosis:  Screening for colorectal cancer [Z12.11, Z12.12]    Post-op Diagnosis:  Post-Op Diagnosis Codes:     * Screening for colorectal cancer [Z12.11, Z12.12]      - Colon Polyps  Procedure(s) (LRB):  COLONOSCOPY (N/A)    Anesthesia: Choice    Description of the findings of the procedure: Procedures completed. See Procedure note for full details.    Findings/Key Components: Procedures completed. See Procedure note for full details.    Prosthetics/Devices: None    Estimated Blood Loss: 1 mL         Specimens:   Specimen (12h ago through future)    Start     Ordered    06/07/18 0723  Specimen to Pathology - Surgery  Once     Comments:  1. Hepatic flexure polyp2. Rectum polyp      06/07/18 0728          Discharge Note    SUMMARY     Admit Date: 6/7/2018    Discharge Date and Time: 6/7/2018    Hospital Course (synopsis of major diagnoses, care, treatment, and services provided during the course of the hospital stay):  Procedures completed. See Procedure note for full details. Discharge patient when discharge criteria met.    Final Diagnosis: Post-Op Diagnosis Codes:     * Screening for colorectal cancer [Z12.11, Z12.12]     - Colon Polyps  Disposition: Discharge patient when discharge criteria met.    Follow Up/Patient Instructions:       Medications:  Reconciled Home Medications: Current Discharge Medication List      CONTINUE these medications which have NOT CHANGED    Details   amLODIPine (NORVASC) 10 MG tablet Take 1 tablet (10 mg total) by mouth once daily.  Qty: 90 tablet, Refills: 3    Associated Diagnoses: Essential hypertension, benign      aspirin (ECOTRIN) 81 MG EC tablet Take 1 tablet (81 mg total) by mouth once daily.  Qty: 90 tablet, Refills: 3    Comments: Pharmacist, please help him choose quality generic OTC  for this. Thank you!  Associated Diagnoses: Essential hypertension, benign      atorvastatin (LIPITOR) 40 MG tablet Take 1 tablet (40 mg total) by mouth nightly.  Qty: 90 tablet, Refills: 3    Associated Diagnoses: Mixed dyslipidemia      chlorthalidone (HYGROTEN) 25 MG Tab Take 1 tablet (25 mg total) by mouth once daily.  Qty: 30 tablet, Refills: 3    Associated Diagnoses: Essential hypertension, benign      citalopram (CELEXA) 20 MG tablet Take 1 tablet (20 mg total) by mouth once daily.  Qty: 90 tablet, Refills: 3    Associated Diagnoses: Recurrent major depression in partial remission      metFORMIN (GLUCOPHAGE-XR) 500 MG 24 hr tablet Take 1 tablet (500 mg total) by mouth every evening.  Qty: 90 tablet, Refills: 3    Associated Diagnoses: Prediabetes      metoprolol succinate (TOPROL-XL) 100 MG 24 hr tablet Take 1 tablet (100 mg total) by mouth once daily.  Qty: 90 tablet, Refills: 3    Comments: IMPORTANT!  This REPLACES atenolol-chlorthalidone. DISCONTINUE any existing prescription for atenolol-chlorthalidone.  Associated Diagnoses: Essential hypertension, benign      valsartan (DIOVAN) 160 MG tablet Take 1 tablet (160 mg total) by mouth once daily.  Qty: 30 tablet, Refills: 3    Associated Diagnoses: Essential hypertension, benign      cholecalciferol, vitamin D3, (VITAMIN D3) 2,000 unit Cap Take 1 capsule by mouth once daily.      MV,MINERALS/FA/LYCOPENE/GINKGO (DAILY MULTIPLE FOR MEN 50+ ORAL) Take 1 tablet by mouth once daily.      omega-3 fatty acids-vitamin E (FISH OIL) 1,000 mg Cap Take 1 capsule by mouth once daily.              Discharge Procedure Orders  Diet general     Activity as tolerated

## 2018-06-07 NOTE — PLAN OF CARE
Jadiel HERNANDEZ at bedside. Discussed procedure results with patient and family. Vital signs stable.

## 2018-06-07 NOTE — ANESTHESIA POSTPROCEDURE EVALUATION
"Anesthesia Post Evaluation    Patient: Kennedy Cervantes    Procedure(s) Performed: Procedure(s) (LRB):  COLONOSCOPY (N/A)    Final Anesthesia Type: MAC  Patient location during evaluation: PACU  Patient participation: Yes- Able to Participate  Level of consciousness: awake and alert  Post-procedure vital signs: reviewed and stable  Pain management: adequate  Airway patency: patent  PONV status at discharge: No PONV  Anesthetic complications: no      Cardiovascular status: blood pressure returned to baseline  Respiratory status: unassisted  Hydration status: euvolemic  Follow-up not needed.        Visit Vitals  BP (!) 154/88 (BP Location: Left arm, Patient Position: Lying)   Pulse 68   Temp 36.7 °C (98 °F) (Oral)   Resp 20   Ht 6' 5" (1.956 m)   Wt 127.5 kg (281 lb)   SpO2 96%   BMI 33.32 kg/m²       Pain/Marii Score: Pain Assessment Performed: Yes (6/7/2018  6:51 AM)  Presence of Pain: denies (6/7/2018  6:51 AM)      "

## 2018-06-08 ENCOUNTER — PATIENT OUTREACH (OUTPATIENT)
Dept: OTHER | Facility: OTHER | Age: 63
End: 2018-06-08

## 2018-06-08 DIAGNOSIS — I10 ESSENTIAL HYPERTENSION, BENIGN: Primary | Chronic | ICD-10-CM

## 2018-06-08 RX ORDER — VALSARTAN 320 MG/1
320 TABLET ORAL DAILY
Qty: 30 TABLET | Refills: 3 | Status: SHIPPED | OUTPATIENT
Start: 2018-06-08 | End: 2018-07-23 | Stop reason: CLARIF

## 2018-06-08 NOTE — PROGRESS NOTES
Last 5 Patient Entered Readings                                      Current 30 Day Average: 150/93     Recent Readings 6/7/2018 6/4/2018 6/1/2018 5/30/2018 5/28/2018    SBP (mmHg) 150 154 151 150 142    DBP (mmHg) 92 91 98 99 94    Pulse 63 61 66 71 73      Patient's BP average is above goal of <130/80.     Patient denies s/s of hypotension (lightheadedness, dizziness, nausea, fatigue) associated with low readings. Instructed patient to inform me if this occurs, patient confirms understanding.      Patient denies s/s of hypertension (SOB, CP, severe headaches, changes in vision) associated with high readings. Instructed patient to go to the ED if BP > 180/110 and accompanied by hypertensive s/s, patient confirms understanding.    Will continue to monitor regularly. Will follow up in 2-3 weeks, sooner if BP begins to trend upward or downward.    Patient has my contact information and knows to call with any concerns or clinical changes.     Current HTN regimen:  Hypertension Medications             amLODIPine (NORVASC) 10 MG tablet Take 1 tablet (10 mg total) by mouth once daily.    chlorthalidone (HYGROTEN) 25 MG Tab Take 1 tablet (25 mg total) by mouth once daily.    metoprolol succinate (TOPROL-XL) 100 MG 24 hr tablet Take 1 tablet (100 mg total) by mouth once daily.    valsartan (DIOVAN) 160 MG tablet Take 1 tablet (160 mg total) by mouth once daily.      Patient will increase valsartan to 320mg daily. Patient has dropped five pounds since starting in the program and continues to work towards an exercise goal of 4 days a week.

## 2018-06-13 ENCOUNTER — PATIENT MESSAGE (OUTPATIENT)
Dept: GASTROENTEROLOGY | Facility: CLINIC | Age: 63
End: 2018-06-13

## 2018-06-13 PROBLEM — K63.5 HYPERPLASTIC POLYP OF SIGMOID COLON: Chronic | Status: ACTIVE | Noted: 2018-06-07

## 2018-06-19 ENCOUNTER — PATIENT OUTREACH (OUTPATIENT)
Dept: OTHER | Facility: OTHER | Age: 63
End: 2018-06-19

## 2018-06-19 ENCOUNTER — LAB VISIT (OUTPATIENT)
Dept: LAB | Facility: HOSPITAL | Age: 63
End: 2018-06-19
Attending: UROLOGY
Payer: COMMERCIAL

## 2018-06-19 DIAGNOSIS — Z12.5 PROSTATE CANCER SCREENING: ICD-10-CM

## 2018-06-19 DIAGNOSIS — I10 ESSENTIAL HYPERTENSION, BENIGN: Chronic | ICD-10-CM

## 2018-06-19 DIAGNOSIS — I86.1 VARICOSE VEIN OF SCROTUM: ICD-10-CM

## 2018-06-19 LAB
ANION GAP SERPL CALC-SCNC: 5 MMOL/L
BUN SERPL-MCNC: 20 MG/DL
CALCIUM SERPL-MCNC: 9.4 MG/DL
CHLORIDE SERPL-SCNC: 103 MMOL/L
CO2 SERPL-SCNC: 29 MMOL/L
COMPLEXED PSA SERPL-MCNC: 0.94 NG/ML
CREAT SERPL-MCNC: 1 MG/DL
EST. GFR  (AFRICAN AMERICAN): >60 ML/MIN/1.73 M^2
EST. GFR  (NON AFRICAN AMERICAN): >60 ML/MIN/1.73 M^2
GLUCOSE SERPL-MCNC: 95 MG/DL
POTASSIUM SERPL-SCNC: 4.4 MMOL/L
SODIUM SERPL-SCNC: 137 MMOL/L

## 2018-06-19 PROCEDURE — 36415 COLL VENOUS BLD VENIPUNCTURE: CPT

## 2018-06-19 PROCEDURE — 80048 BASIC METABOLIC PNL TOTAL CA: CPT

## 2018-06-19 PROCEDURE — 84153 ASSAY OF PSA TOTAL: CPT

## 2018-06-19 NOTE — PROGRESS NOTES
Last 5 Patient Entered Readings                                      Current 30 Day Average: 144/90     Recent Readings 6/17/2018 6/16/2018 6/10/2018 6/9/2018 6/7/2018    SBP (mmHg) 126 116 141 131 150    DBP (mmHg) 79 75 88 82 92    Pulse 69 76 70 78 63            Digital Medicine: Health  Follow Up    Left voicemail to follow up with Mr. Kennedy Cervantes.  Current BP average 144/90 mmHg is not at goal, 130/80 mmHg. FU regarding goals of weight loss, coffee reduction, sodium monitoring, and attending gym 4x/week.

## 2018-06-22 ENCOUNTER — PATIENT OUTREACH (OUTPATIENT)
Dept: OTHER | Facility: OTHER | Age: 63
End: 2018-06-22

## 2018-06-22 NOTE — PROGRESS NOTES
Last 5 Patient Entered Readings                                      Current 30 Day Average: 143/91     Recent Readings 6/21/2018 6/17/2018 6/16/2018 6/10/2018 6/9/2018    SBP (mmHg) 156 126 116 141 131    DBP (mmHg) 101 79 75 88 82    Pulse 67 69 76 70 78      Patient's BP average is above goal of <130/80.     Patient denies s/s of hypotension (lightheadedness, dizziness, nausea, fatigue) associated with low readings. Instructed patient to inform me if this occurs, patient confirms understanding.      Patient denies s/s of hypertension (SOB, CP, severe headaches, changes in vision) associated with high readings. Instructed patient to go to the ED if BP > 180/110 and accompanied by hypertensive s/s, patient confirms understanding.    Will continue to monitor regularly. Will follow up in 2-3 weeks, sooner if BP begins to trend upward or downward.    Patient has my contact information and knows to call with any concerns or clinical changes.     Current HTN regimen:  Hypertension Medications             amLODIPine (NORVASC) 10 MG tablet Take 1 tablet (10 mg total) by mouth once daily.    chlorthalidone (HYGROTEN) 25 MG Tab Take 1 tablet (25 mg total) by mouth once daily.    metoprolol succinate (TOPROL-XL) 100 MG 24 hr tablet Take 1 tablet (100 mg total) by mouth once daily.    valsartan (DIOVAN) 320 MG tablet Take 1 tablet (320 mg total) by mouth once daily.      LMFCB to discuss recent high reading.    Patient called back informing me he consumed a box of [employer]itz Crackers and stress attributing to his high reading yesterday. I will maintain his current medications and won't recommend adjusting medications until I have more readings.

## 2018-06-29 ENCOUNTER — PATIENT OUTREACH (OUTPATIENT)
Dept: OTHER | Facility: OTHER | Age: 63
End: 2018-06-29

## 2018-06-29 NOTE — PROGRESS NOTES
Last 5 Patient Entered Readings                                      Current 30 Day Average: 144/90     Recent Readings 6/23/2018 6/21/2018 6/17/2018 6/16/2018 6/10/2018    SBP (mmHg) 164 156 126 116 141    DBP (mmHg) 91 101 79 75 88    Pulse 80 67 69 76 70      Patient's BP average is above goal of <130/80.     Patient denies s/s of hypotension (lightheadedness, dizziness, nausea, fatigue) associated with low readings. Instructed patient to inform me if this occurs, patient confirms understanding.      Patient denies s/s of hypertension (SOB, CP, severe headaches, changes in vision) associated with high readings. Instructed patient to go to the ED if BP > 180/110 and accompanied by hypertensive s/s, patient confirms understanding.    Will continue to monitor regularly. Will follow up in 2-3 weeks, sooner if BP begins to trend upward or downward.    Patient has my contact information and knows to call with any concerns or clinical changes.     Current HTN regimen:  Hypertension Medications             amLODIPine (NORVASC) 10 MG tablet Take 1 tablet (10 mg total) by mouth once daily.    chlorthalidone (HYGROTEN) 25 MG Tab Take 1 tablet (25 mg total) by mouth once daily.    metoprolol succinate (TOPROL-XL) 100 MG 24 hr tablet Take 1 tablet (100 mg total) by mouth once daily.    valsartan (DIOVAN) 320 MG tablet Take 1 tablet (320 mg total) by mouth once daily.      Patient is currently traveling in [place] and would like a week to get back into his normal routine before adjusting medications. If he remains elevated, I will add doxazosin 2mg daily since he has BPH.

## 2018-06-29 NOTE — PROGRESS NOTES
Last 5 Patient Entered Readings                                      Current 30 Day Average: 144/90     Recent Readings 6/23/2018 6/21/2018 6/17/2018 6/16/2018 6/10/2018    SBP (mmHg) 164 156 126 116 141    DBP (mmHg) 91 101 79 75 88    Pulse 80 67 69 76 70          Digital Medicine: Health  Follow Up    Lifestyle Modifications:    1.Dietary Modifications (Sodium intake <2,000mg/day, food labels, dining out): Reports he is continuing to try to reduce caffeine intake and has been more aware of sodium, however is currently traveling in [place] and notes this has been a little bit more challenging. States he feels he is doing his best in this area, and is planning to continue to focus on these things when he returns to [place] on Sunday. Encouraged to continue.     2.Physical Activity: States he has been sitting more than normal, however has integrated hiking into his routine this week. States he completed a 5 mile hike on Sunday and is planning to hike again today. Discussed effect of elevation on bp, hr, and respiration rate; verbalizes understanding.     3.Medication Therapy: Patient has been compliant with the medication regimen.    4.Patient has the following medication side effects/concerns: No side effects/concerns per patient.   (Frequency/Alleviating factors/Precipitating factors, etc.)     Follow up with Mr. Kennedy Cervantes completed. No further questions or concerns. Will continue follow up to achieve health goals.

## 2018-07-06 ENCOUNTER — PATIENT OUTREACH (OUTPATIENT)
Dept: OTHER | Facility: OTHER | Age: 63
End: 2018-07-06

## 2018-07-06 DIAGNOSIS — I10 ESSENTIAL HYPERTENSION, BENIGN: Primary | Chronic | ICD-10-CM

## 2018-07-06 RX ORDER — DOXAZOSIN 2 MG/1
2 TABLET ORAL NIGHTLY
Qty: 30 TABLET | Refills: 3 | Status: SHIPPED | OUTPATIENT
Start: 2018-07-06 | End: 2018-10-30 | Stop reason: SDUPTHER

## 2018-07-06 NOTE — PROGRESS NOTES
Last 5 Patient Entered Readings                                      Current 30 Day Average: 140/87     Recent Readings 7/5/2018 7/4/2018 7/4/2018 7/2/2018 7/2/2018    SBP (mmHg) 138 117 136 161 159    DBP (mmHg) 91 76 84 96 97    Pulse 70 72 81 58 73        Patient's BP average is above goal of <130/80.     Patient denies s/s of hypotension (lightheadedness, dizziness, nausea, fatigue) associated with low readings. Instructed patient to inform me if this occurs, patient confirms understanding.      Patient denies s/s of hypertension (SOB, CP, severe headaches, changes in vision) associated with high readings. Instructed patient to go to the ED if BP > 180/110 and accompanied by hypertensive s/s, patient confirms understanding.    Will continue to monitor regularly. Will follow up in 2-3 weeks, sooner if BP begins to trend upward or downward.    Patient has my contact information and knows to call with any concerns or clinical changes.     Current HTN regimen:  Hypertension Medications             amLODIPine (NORVASC) 10 MG tablet Take 1 tablet (10 mg total) by mouth once daily.    chlorthalidone (HYGROTEN) 25 MG Tab Take 1 tablet (25 mg total) by mouth once daily.    doxazosin (CARDURA) 2 MG tablet Take 1 tablet (2 mg total) by mouth every evening.    metoprolol succinate (TOPROL-XL) 100 MG 24 hr tablet Take 1 tablet (100 mg total) by mouth once daily.    valsartan (DIOVAN) 320 MG tablet Take 1 tablet (320 mg total) by mouth once daily.      Patient's blood pressure remains elevated. He has mild BPH symptoms but with this indication, I will add doxazosin 2mg nightly to his regimen. We discussed side effects especially orthostatic hypotension. If these occur, he will contact me and we change metoprolol to carvedilol.

## 2018-07-23 ENCOUNTER — PATIENT OUTREACH (OUTPATIENT)
Dept: OTHER | Facility: OTHER | Age: 63
End: 2018-07-23

## 2018-07-23 DIAGNOSIS — I10 ESSENTIAL HYPERTENSION, BENIGN: Primary | Chronic | ICD-10-CM

## 2018-07-23 RX ORDER — IRBESARTAN 300 MG/1
300 TABLET ORAL DAILY
Qty: 30 TABLET | Refills: 3 | Status: SHIPPED | OUTPATIENT
Start: 2018-07-23 | End: 2018-10-30 | Stop reason: SDUPTHER

## 2018-07-23 NOTE — PROGRESS NOTES
Last 5 Patient Entered Readings                                      Current 30 Day Average: 130/82     Recent Readings 7/19/2018 7/16/2018 7/15/2018 7/15/2018 7/15/2018    SBP (mmHg) 142 135 109 87 94    DBP (mmHg) 91 89 66 55 54    Pulse 82 73 76 75 77        Patient's BP average is above goal of <130/80.     Patient denies s/s of hypotension (lightheadedness, dizziness, nausea, fatigue) associated with low readings. Instructed patient to inform me if this occurs, patient confirms understanding.      Patient denies s/s of hypertension (SOB, CP, severe headaches, changes in vision) associated with high readings. Instructed patient to go to the ED if BP > 180/110 and accompanied by hypertensive s/s, patient confirms understanding.    Will continue to monitor regularly. Will follow up in 2-3 weeks, sooner if BP begins to trend upward or downward.    Patient has my contact information and knows to call with any concerns or clinical changes.     Current HTN regimen:  Hypertension Medications             amLODIPine (NORVASC) 10 MG tablet Take 1 tablet (10 mg total) by mouth once daily.    chlorthalidone (HYGROTEN) 25 MG Tab Take 1 tablet (25 mg total) by mouth once daily.    doxazosin (CARDURA) 2 MG tablet Take 1 tablet (2 mg total) by mouth every evening.    irbesartan (AVAPRO) 300 MG tablet Take 1 tablet (300 mg total) by mouth once daily.    metoprolol succinate (TOPROL-XL) 100 MG 24 hr tablet Take 1 tablet (100 mg total) by mouth once daily.        Patient's blood pressure is close to goal however he remains slightly elevated. He is affected by the valsartan recall therefore I am changing him to irbesartan 300mg daily.

## 2018-08-02 ENCOUNTER — PATIENT OUTREACH (OUTPATIENT)
Dept: OTHER | Facility: OTHER | Age: 63
End: 2018-08-02

## 2018-08-02 NOTE — PROGRESS NOTES
Last 5 Patient Entered Readings                                      Current 30 Day Average: 126/81     Recent Readings 8/1/2018 7/28/2018 7/28/2018 7/26/2018 7/25/2018    SBP (mmHg) 151 122 125 122 131    DBP (mmHg) 98 74 83 78 86    Pulse 64 76 87 75 70            Digital Medicine: Health  Follow Up    Left voicemail to follow up with Mr. Kennedy Irving Helen.  Current BP average 126/81 mmHg is close to goal, 130/80 mmHg. FU on continued goal of reducing caffeine and recent medication change. It also appears patient has met first weight loss goal.     8/16 FU pushed 1.5 weeks to allow patient time to resume readings/work schedule after returning from [place]. PharmD Will be following up with patient next week.

## 2018-08-06 ENCOUNTER — PATIENT OUTREACH (OUTPATIENT)
Dept: OTHER | Facility: OTHER | Age: 63
End: 2018-08-06

## 2018-08-06 NOTE — PROGRESS NOTES
Last 5 Patient Entered Readings                                      Current 30 Day Average: 129/82     Recent Readings 8/4/2018 8/2/2018 8/2/2018 8/1/2018 7/28/2018    SBP (mmHg) 137 152 137 151 122    DBP (mmHg) 86 92 87 98 74    Pulse 94 57 71 64 76      Patient's BP average is above goal of <130/80.     Patient denies s/s of hypotension (nausea, fatigue) associated with low readings. Instructed patient to inform me if this occurs, patient confirms understanding.      Patient denies s/s of hypertension (SOB, CP, severe headaches, changes in vision) associated with high readings. Instructed patient to go to the ED if BP > 180/110 and accompanied by hypertensive s/s, patient confirms understanding.    Will continue to monitor regularly. Will follow up in 2-3 weeks, sooner if BP begins to trend upward or downward.    Patient has my contact information and knows to call with any concerns or clinical changes.     Current HTN regimen:  Hypertension Medications             amLODIPine (NORVASC) 10 MG tablet Take 1 tablet (10 mg total) by mouth once daily.    chlorthalidone (HYGROTEN) 25 MG Tab Take 1 tablet (25 mg total) by mouth once daily.    doxazosin (CARDURA) 2 MG tablet Take 1 tablet (2 mg total) by mouth every evening.    irbesartan (AVAPRO) 300 MG tablet Take 1 tablet (300 mg total) by mouth once daily.    metoprolol succinate (TOPROL-XL) 100 MG 24 hr tablet Take 1 tablet (100 mg total) by mouth once daily.      Patient stated stress is driving his upward trend. He is currently in [place] visiting [employer] and had work obligations to complete prior to his vacation. He did not bring his blood pressure machine with him out of town and will return home 8/12.     He is experiencing dizziness and lightheadedness on occasion when he goes from a sitting to a standing position. He is attributing this to the high altitude and does not want to adjust medications at this time.     We will reassess his medications and  orthostatic hypotension symptoms in two weeks.

## 2018-08-20 ENCOUNTER — PATIENT OUTREACH (OUTPATIENT)
Dept: OTHER | Facility: OTHER | Age: 63
End: 2018-08-20

## 2018-08-20 NOTE — PROGRESS NOTES
Last 5 Patient Entered Readings                                      Current 30 Day Average: 133/84     Recent Readings 8/19/2018 8/18/2018 8/15/2018 8/13/2018 8/13/2018    SBP (mmHg) 128 134 133 120 130    DBP (mmHg) 73 86 84 79 80    Pulse 81 70 65 69 73      Patient's BP average is above goal of <130/80.     Patient denies s/s of hypotension (lightheadedness, dizziness, nausea, fatigue) associated with low readings. Instructed patient to inform me if this occurs, patient confirms understanding.      Patient denies s/s of hypertension (SOB, CP, severe headaches, changes in vision) associated with high readings. Instructed patient to go to the ED if BP > 180/110 and accompanied by hypertensive s/s, patient confirms understanding.    Will continue to monitor regularly. Will follow up in 4 weeks, sooner if BP begins to trend upward or downward.    Patient has my contact information and knows to call with any concerns or clinical changes.     Current HTN regimen:  Hypertension Medications             amLODIPine (NORVASC) 10 MG tablet Take 1 tablet (10 mg total) by mouth once daily.    chlorthalidone (HYGROTEN) 25 MG Tab Take 1 tablet (25 mg total) by mouth once daily.    doxazosin (CARDURA) 2 MG tablet Take 1 tablet (2 mg total) by mouth every evening.    irbesartan (AVAPRO) 300 MG tablet Take 1 tablet (300 mg total) by mouth once daily.    metoprolol succinate (TOPROL-XL) 100 MG 24 hr tablet Take 1 tablet (100 mg total) by mouth once daily.      Patient denies orthostatic hypotension since he is now back home and no longer in the higher altitude. He will work on lifestyle through diet and exercise to improve his blood pressure for the next month.

## 2018-08-28 NOTE — PROGRESS NOTES
Last 5 Patient Entered Readings                                      Current 30 Day Average: 135/85     Recent Readings 8/26/2018 8/25/2018 8/19/2018 8/18/2018 8/15/2018    SBP (mmHg) 122 135 128 134 133    DBP (mmHg) 80 87 73 86 84    Pulse 82 80 81 70 65          Digital Medicine: Health  Follow Up    Lifestyle Modifications:    1.Dietary Modifications (Sodium intake <2,000mg/day, food labels, dining out): Patent reports his primary focus has been on reducing sodium. States his wife has been cooking at home, using low sodium options and not adding salt. States he is finding he is able to find healthy alternatives he enjoys and that he feels confident in continuing this. Praised for integrating this so far, and patient states he's been pleased to see that he's already noticed improvement in his readings. Encouraged to continue.     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 States he does try to get up and stretch when he can, walk while talking on the phone, and integrating walking around the office. States he'd like to continue building on this over the next 4 weeks as well. It appears patient has met his first weight loss goal, and will want to review this at next contact.     3.Medication Therapy: Patient has been compliant with the medication regimen.    4.Patient has the following medication side effects/concerns: No side effects/concerns per patient.     Follow up with Mr. Kennedy Cervantes completed. No further questions or concerns. Will continue follow up to achieve health goals.

## 2018-09-27 ENCOUNTER — PATIENT OUTREACH (OUTPATIENT)
Dept: OTHER | Facility: OTHER | Age: 63
End: 2018-09-27

## 2018-09-27 NOTE — PROGRESS NOTES
Last 5 Patient Entered Readings                                      Current 30 Day Average: 124/78     Recent Readings 9/22/2018 9/16/2018 9/14/2018 9/3/2018 9/2/2018    SBP (mmHg) 136 122 106 138 111    DBP (mmHg) 82 73 65 90 70    Pulse 81 74 76 75 71          States he will cb around 11AM today. Will try again next week if no cb. FU on patient goal to continue with sodium monitoring and integrating movement into his workday. BP average has improved from last months average of 135/85 .

## 2018-09-28 NOTE — PROGRESS NOTES
Last 5 Patient Entered Readings                                      Current 30 Day Average: 124/78     Recent Readings 9/22/2018 9/16/2018 9/14/2018 9/3/2018 9/2/2018    SBP (mmHg) 136 122 106 138 111    DBP (mmHg) 82 73 65 90 70    Pulse 81 74 76 75 71          Digital Medicine: Health  Follow Up    Lifestyle Modifications:    1.Dietary Modifications (Sodium intake <2,000mg/day, food labels, dining out): Patient reports he has been more consistent with nutrition and integrating exercise into his routine over the past month. Reviewed blood pressure progress and encouraged him to continue, reinforcing importance of finding a balanced, healthy lifestyle that works for him. States he's been exercising more and is feeling positive overall.     2.Physical Activity: Sates he has been able to exercise 2-3 times per week most weeks and would like to continue focusing on this.     3.Medication Therapy: Patient has been compliant with the medication regimen.    4.Patient has the following medication side effects/concerns: Denies side effects/concerns.     Follow up with Mr. Kennedy Cervantes completed. No further questions or concerns. Will continue to follow up to achieve health goals.

## 2018-09-30 DIAGNOSIS — I10 ESSENTIAL HYPERTENSION, BENIGN: Chronic | ICD-10-CM

## 2018-10-03 RX ORDER — CHLORTHALIDONE 25 MG/1
TABLET ORAL
Qty: 90 TABLET | Refills: 1 | Status: SHIPPED | OUTPATIENT
Start: 2018-10-03 | End: 2018-10-30 | Stop reason: SDUPTHER

## 2018-10-05 NOTE — PROGRESS NOTES
Last 5 Patient Entered Readings                                      Current 30 Day Average: 124/76     Recent Readings 10/1/2018 9/30/2018 9/29/2018 9/22/2018 9/16/2018    SBP (mmHg) 124 146 108 136 122    DBP (mmHg) 77 93 68 82 73    Pulse 73 85 85 81 74      Chart reviewed. Patient at goal and no BP medications require adjustments at this time. Will repeat BMP with his next outreach.

## 2018-10-29 ENCOUNTER — PATIENT OUTREACH (OUTPATIENT)
Dept: OTHER | Facility: OTHER | Age: 63
End: 2018-10-29

## 2018-10-29 NOTE — PROGRESS NOTES
Last 5 Patient Entered Readings                                      Current 30 Day Average: 120/75     Recent Readings 10/28/2018 10/21/2018 10/21/2018 10/14/2018 10/7/2018    SBP (mmHg) 110 120 147 113 116    DBP (mmHg) 67 75 89 73 75    Pulse 75 76 85 81 73          Digital Medicine: Health  Follow Up    Lifestyle Modifications:    1.Dietary Modifications (Sodium intake <2,000mg/day, food labels, dining out): Patient reports he's continued with sodium monitoring and has noticed he is much more self aware of how his body reacts and when he consumes higher sodium meals. States he has been making better choices while dining out and while cooking, and that this is something he feels is now a part of his lifestyle. Encouraged to continue with this and praised for progress.     2.Physical Activity: States he has purchased a bike and he and his wife have been integrating cycling into their walking and exercise routine. States the cooler weather has been encouraging and motivating.     3.Medication Therapy: Patient has been compliant with the medication regimen. Is requesting these all be changed to 90 day supply if possible.     4.Patient has the following medication side effects/concerns: No side effects per patient and only concern is increasing medications to 90 day supply if possible for convenience.     Follow up with Mr. Kennedy Cervantes completed. No further questions or concerns. Will continue to follow up to achieve health goals.

## 2018-10-30 ENCOUNTER — PATIENT OUTREACH (OUTPATIENT)
Dept: OTHER | Facility: OTHER | Age: 63
End: 2018-10-30

## 2018-10-30 DIAGNOSIS — I10 ESSENTIAL HYPERTENSION, BENIGN: Chronic | ICD-10-CM

## 2018-10-30 RX ORDER — AMLODIPINE BESYLATE 10 MG/1
10 TABLET ORAL DAILY
Qty: 90 TABLET | Refills: 3 | Status: SHIPPED | OUTPATIENT
Start: 2018-10-30 | End: 2019-02-01 | Stop reason: SDUPTHER

## 2018-10-30 RX ORDER — CHLORTHALIDONE 25 MG/1
25 TABLET ORAL DAILY
Qty: 90 TABLET | Refills: 3 | Status: SHIPPED | OUTPATIENT
Start: 2018-10-30 | End: 2019-02-01 | Stop reason: SDUPTHER

## 2018-10-30 RX ORDER — METOPROLOL SUCCINATE 100 MG/1
100 TABLET, EXTENDED RELEASE ORAL DAILY
Qty: 90 TABLET | Refills: 3 | Status: SHIPPED | OUTPATIENT
Start: 2018-10-30 | End: 2019-02-01 | Stop reason: SDUPTHER

## 2018-10-30 RX ORDER — DOXAZOSIN 2 MG/1
2 TABLET ORAL NIGHTLY
Qty: 90 TABLET | Refills: 3 | Status: SHIPPED | OUTPATIENT
Start: 2018-10-30 | End: 2019-06-21

## 2018-10-30 RX ORDER — IRBESARTAN 300 MG/1
300 TABLET ORAL DAILY
Qty: 90 TABLET | Refills: 3 | Status: SHIPPED | OUTPATIENT
Start: 2018-10-30 | End: 2019-02-01 | Stop reason: SDUPTHER

## 2018-10-30 RX ORDER — DOXAZOSIN 2 MG/1
TABLET ORAL
Qty: 30 TABLET | Refills: 0 | Status: CANCELLED | OUTPATIENT
Start: 2018-10-30

## 2018-10-30 NOTE — PROGRESS NOTES
Last 5 Patient Entered Readings                                      Current 30 Day Average: 122/77     Recent Readings 10/28/2018 10/21/2018 10/21/2018 10/14/2018 10/7/2018    SBP (mmHg) 110 120 147 113 116    DBP (mmHg) 67 75 89 73 75    Pulse 75 76 85 81 73        Patient's BP average is controlled based on 2017 ACC/AHA HTN guidelines of goal BP <130/80.      Patient denies s/s of hypotension (lightheadedness, dizziness, nausea, fatigue) associated with low readings. Instructed patient to inform me if this occurs, patient confirms understanding.      Patient denies s/s of hypertension (SOB, CP, severe headaches, changes in vision) associated with high readings. Instructed patient to go to the ED if BP > 180/110 and accompanied by hypertensive s/s, patient confirms understanding.     Patient asked for a 90 day supply of his medications. I sent over the prescriptions to his local pharmacy.    Patient's health , Bita Bagley, will be following up every 3-4 weeks. I will continue to monitor regularly and will follow up in 3-4 months, sooner if BP begins to trend upward or downward.    Patient has my contact information and knows to call with any concerns or clinical changes.     Current HTN regimen:  Hypertension Medications             amLODIPine (NORVASC) 10 MG tablet Take 1 tablet (10 mg total) by mouth once daily.    chlorthalidone (HYGROTEN) 25 MG Tab TAKE 1 TABLET BY MOUTH ONCE DAILY    doxazosin (CARDURA) 2 MG tablet Take 1 tablet (2 mg total) by mouth every evening.    irbesartan (AVAPRO) 300 MG tablet Take 1 tablet (300 mg total) by mouth once daily.    metoprolol succinate (TOPROL-XL) 100 MG 24 hr tablet Take 1 tablet (100 mg total) by mouth once daily.

## 2018-12-04 ENCOUNTER — OFFICE VISIT (OUTPATIENT)
Dept: INTERNAL MEDICINE | Facility: CLINIC | Age: 63
End: 2018-12-04
Payer: COMMERCIAL

## 2018-12-04 VITALS
HEIGHT: 77 IN | SYSTOLIC BLOOD PRESSURE: 138 MMHG | BODY MASS INDEX: 35.33 KG/M2 | WEIGHT: 299.19 LBS | HEART RATE: 71 BPM | DIASTOLIC BLOOD PRESSURE: 76 MMHG | OXYGEN SATURATION: 98 % | TEMPERATURE: 98 F

## 2018-12-04 DIAGNOSIS — L02.11 CUTANEOUS ABSCESS OF NECK: Primary | ICD-10-CM

## 2018-12-04 PROCEDURE — 99999 PR PBB SHADOW E&M-EST. PATIENT-LVL IV: CPT | Mod: PBBFAC,,, | Performed by: FAMILY MEDICINE

## 2018-12-04 PROCEDURE — 99213 OFFICE O/P EST LOW 20 MIN: CPT | Mod: 25,S$GLB,, | Performed by: FAMILY MEDICINE

## 2018-12-04 PROCEDURE — 10061 I&D ABSCESS COMP/MULTIPLE: CPT | Mod: S$GLB,,, | Performed by: FAMILY MEDICINE

## 2018-12-04 RX ORDER — LIDOCAINE HYDROCHLORIDE 10 MG/ML
5 INJECTION INFILTRATION; PERINEURAL
Status: DISCONTINUED | OUTPATIENT
Start: 2018-12-04 | End: 2019-06-21

## 2018-12-04 NOTE — PROGRESS NOTES
PROBLEM/CONDITION: NEW PROBLEM is apparent abscess/boil. LOCATION is left side of neck. QUALITY described as painful swelling. SEVERITY described as MODERATE. ONSET reported as over the last few days. TIMING described as getting worse. He reports no ASSOCIATED fever, otalgia, chest pain, difficulty breathing, or bleeding problems. Physical exam of the area in question reveals a MODERATELY tender subcutaneous nodule with maximum diameter roughly 2.5 cm.

## 2018-12-04 NOTE — PROGRESS NOTES
"CHIEF COMPLAINT  Abscess      HISTORY OF PRESENT ILLNESS    PROBLEM/CONDITION: NEW PROBLEM is apparent abscess/boil. LOCATION is left side of neck. QUALITY described as painful swelling. SEVERITY described as MODERATE. ONSET reported as over the last few days. TIMING described as getting worse. He reports no ASSOCIATED fever, otalgia, chest pain, difficulty breathing, or bleeding problems. Physical exam of the area in question reveals a moderately tender subcutaneous nodule with maximum diameter roughly 2.5 cm.     Problem List Items Addressed This Visit     None      Visit Diagnoses     Cutaneous abscess of neck    -  Primary    Relevant Medications    lidocaine HCL 10 mg/ml (1%) injection 5 mL          PAST MEDICAL HISTORY, FAMILY HISTORY and SOCIAL HISTORY, CURRENT MEDICATION LIST, and ALLERGY LIST reviewed by me (VEE Patterson MD) and are updated consistent with the patient's report.    REVIEW OF SYSTEMS  CONSTITUTIONAL: No fever or chills reported.  ENT: No otalgia or sore throat reported.     PHYSICAL EXAM  Vitals:    12/04/18 1318   BP: 138/76   BP Location: Right arm   Patient Position: Sitting   BP Method: Large (Manual)   Pulse: 71   Temp: 97.7 °F (36.5 °C)   TempSrc: Oral   SpO2: 98%   Weight: 135.7 kg (299 lb 2.6 oz)   Height: 6' 5" (1.956 m)   CONSTITUTIONAL: Vital signs noted. No apparent distress. Does not appear acutely ill or septic. Appears adequately hydrated.  PULM: Breathing unlabored.  CV: Heart regular.  DERM: Skin normothermic. Description of relevant exam of this system is documented above in HPI.   PSYCHIATRIC: Alert and conversant. Grossly oriented. Mood is grossly neutral. Affect appropriate. Judgment and insight not grossly compromised.   MUSCULOSKELETAL: Grossly normal stance and gait.     ASSESSMENT and PLAN  Cutaneous abscess of neck  -     lidocaine HCL 10 mg/ml (1%) injection 5 mL        PROCEDURE NOTE    *PROCEDURE: Incision and drainage of abscess; complicated/multiple (CPT " 92247)  *SURGEON: VEE Patterson MD  *DIAGNOSIS: abscess  *ANATOMIC LOCATION(S): lower left lateral neck  *INFORMED CONSENT: After discussing diagnosis, nature and risks/benefits of procedure, and treatment alternatives, informed consent was obtained in written form, to be scanned into his chart.    *PRE-PROCEDURE SAFETY CHECK:  (1) Patient was patient identified by at least 2 means.  (2) Planned procedure was confirmed.  (3) Correct site was verified.  (4) Anticipated needed supplies were verified as available.    *PREP: Betadine  *ANESTHESIA: 3 cc of 1% lidocaine without epinephrine  *PROCEDURE DESCRIPTION: Using a sterile disposable #11 blade scalpel, the indurated tissue was incised to a length of approximately   1 cm and a depth of approximately 1. 5 cm. A moderate amount of thick tan drainage was able to be expressed. The surgical wound was probed, and multiple loculations were disrupted using sterile disposable iris scissors and forceps. Additional drainage was able to be expressed.  *PACKING: Sterile strip gauze  *DRESSING: Non-adherent gauze and paper-tape  *PATIENT TOLERANCE OF PROCEDURE: Good  *ESTIMATED BLOOD LOSS: 3 mL   *POST-OP HEMOSTASIS: Observed  *COMPLICATIONS: none    Post-op wound care instructions reviewed with the patient, who verbalized understanding.           PRESCRIPTION MEDICATION MANAGEMENT  Medications Ordered This Encounter   Medications    lidocaine HCL 10 mg/ml (1%) injection 5 mL     There are no discontinued medications.    No Follow-up on file.    Patient Instructions   HOW TO CARE FOR YOUR WOUND  Keep the wound clean and dry and covered with gauze bandage until it has completely healed.  If the wound becomes inflamed or very painful or if you start running a fever > 100.3, return for reevaluation or seek medical care promptly, as these may indicate a serious infection.  You can apply a cool-pack to help with pain.  A very small amount of bleeding can occur. This is  "normal. If this happens, apply pressure to the area for 10 minutes, and the bleeding should stop.  You can apply a cool-pack to help with pain and/or bleeding.    WOUND CLEANSING AND PACKING INSTRUCTIONS  To change wound packing/dressing, first gently cleanse the outside of the wound with a damp cloth. Do NOT soak the wound or submerse it under water.  Pat dry.  Re-pack the wound fully with strip gauze as directed and then cover with a bandage or gauze dressing. Repeat this daily until the wound has filled in and it no longer is large enough to be packed.      ABOUT THIS DOCUMENTATION:  · The order of the conditions listed in the HPI is one of convenience and does not necessarily reflect the chronology of the appointment, nor the relative importance of a condition.  · Documentation entered by me for this encounter was done in part using speech-recognition technology. Although I have made an effort to ensure accuracy, "sound like" errors may exist and should be interpreted in context.                        -VEE Patterson MD     "

## 2019-01-17 DIAGNOSIS — I10 ESSENTIAL HYPERTENSION, BENIGN: Chronic | ICD-10-CM

## 2019-01-17 RX ORDER — IRBESARTAN 300 MG/1
300 TABLET ORAL DAILY
Qty: 90 TABLET | Refills: 3 | Status: CANCELLED | OUTPATIENT
Start: 2019-01-17 | End: 2020-01-17

## 2019-01-22 ENCOUNTER — PATIENT OUTREACH (OUTPATIENT)
Dept: OTHER | Facility: OTHER | Age: 64
End: 2019-01-22

## 2019-01-22 DIAGNOSIS — I10 ESSENTIAL HYPERTENSION, BENIGN: Chronic | ICD-10-CM

## 2019-01-22 NOTE — PROGRESS NOTES
Last 5 Patient Entered Readings                                      Current 30 Day Average: 123/75     Recent Readings 1/20/2019 1/13/2019 1/6/2019 1/6/2019 1/5/2019    SBP (mmHg) 102 114 114 101 122    DBP (mmHg) 60 71 73 67 77    Pulse 67 68 71 67 73      Left voicemail and sent [facility] message to follow up with Mr. Kennedy Cervantes.    Per 30 day average, blood pressure is well controlled 123/75 mmHg. Encouraged adherence to low sodium diet and physical activity guidelines. Advised patient to call or message with questions or concerns. Follow up in 12 weeks.

## 2019-02-01 DIAGNOSIS — I10 ESSENTIAL HYPERTENSION, BENIGN: Chronic | ICD-10-CM

## 2019-02-01 RX ORDER — CHLORTHALIDONE 25 MG/1
25 TABLET ORAL DAILY
Qty: 90 TABLET | Refills: 3 | Status: SHIPPED | OUTPATIENT
Start: 2019-02-01 | End: 2020-02-05 | Stop reason: SDUPTHER

## 2019-02-01 RX ORDER — IRBESARTAN 300 MG/1
300 TABLET ORAL DAILY
Qty: 90 TABLET | Refills: 3 | Status: SHIPPED | OUTPATIENT
Start: 2019-02-01 | End: 2020-02-05 | Stop reason: SDUPTHER

## 2019-02-01 RX ORDER — METOPROLOL SUCCINATE 100 MG/1
100 TABLET, EXTENDED RELEASE ORAL DAILY
Qty: 90 TABLET | Refills: 3 | Status: SHIPPED | OUTPATIENT
Start: 2019-02-01 | End: 2019-09-16

## 2019-02-01 RX ORDER — AMLODIPINE BESYLATE 10 MG/1
10 TABLET ORAL DAILY
Qty: 90 TABLET | Refills: 3 | Status: SHIPPED | OUTPATIENT
Start: 2019-02-01 | End: 2020-02-05 | Stop reason: SDUPTHER

## 2019-02-01 RX ORDER — AMLODIPINE BESYLATE 10 MG/1
10 TABLET ORAL DAILY
Qty: 90 TABLET | Refills: 3 | Status: CANCELLED | OUTPATIENT
Start: 2019-02-01

## 2019-02-01 NOTE — PROGRESS NOTES
Last 5 Patient Entered Readings                                      Current 30 Day Average: 114/72     Recent Readings 1/30/2019 1/20/2019 1/13/2019 1/6/2019 1/6/2019    SBP (mmHg) 116 102 114 114 101    DBP (mmHg) 76 60 71 73 67    Pulse 63 67 68 71 67      1:05PM: LMFCB to discuss his VM and need for refilling medications.    1:08PM:  HPI:  Called patient to follow up. Patient endorses adherence to medication regimen. Patient denies hypotensive s/sx (lightheadedness, dizziness, nausea, fatigue); patient denies hypertensive s/sx (SOB, CP, severe headaches, changes in vision, dizziness, fatigue, confusion, anxiety, nosebleeds). Instructed patient to seek medical care if BP > 180/110 and is accompanied by hypertensive s/sx associated, patient confirms understanding.     Assessment:  Reviewed recent readings. Per 2017 ACC/ AHA HTN guidelines (goal of BP < 130/80), current 30-day average is well controlled. Patient has lost weight and is working to continue to loose weight.    Plan:   Patient returned my call and we adjusted his local pharmacy and sent a year supply of his blood pressure medications to the new pharmacy. His end goal is to reduce BP medications.  Continue current medication regimen. I will continue to monitor regularly and will follow-up in 12 weeks, sooner if blood pressure begins to trend upward or downward.     Current medication regimen:  Hypertension Medications             amLODIPine (NORVASC) 10 MG tablet Take 1 tablet (10 mg total) by mouth once daily.    chlorthalidone (HYGROTEN) 25 MG Tab Take 1 tablet (25 mg total) by mouth once daily.    doxazosin (CARDURA) 2 MG tablet Take 1 tablet (2 mg total) by mouth every evening.    irbesartan (AVAPRO) 300 MG tablet Take 1 tablet (300 mg total) by mouth once daily.    metoprolol succinate (TOPROL-XL) 100 MG 24 hr tablet Take 1 tablet (100 mg total) by mouth once daily.        Patient denies having questions or concerns. Patient has my contact  information and knows to call with any concerns or clinical changes.

## 2019-02-04 ENCOUNTER — PATIENT MESSAGE (OUTPATIENT)
Dept: INTERNAL MEDICINE | Facility: CLINIC | Age: 64
End: 2019-02-04

## 2019-02-04 DIAGNOSIS — F33.41 RECURRENT MAJOR DEPRESSION IN PARTIAL REMISSION: Chronic | ICD-10-CM

## 2019-02-08 RX ORDER — CITALOPRAM 20 MG/1
20 TABLET, FILM COATED ORAL DAILY
Qty: 90 TABLET | Refills: 3 | Status: SHIPPED | OUTPATIENT
Start: 2019-02-08 | End: 2020-02-05 | Stop reason: SDUPTHER

## 2019-03-26 ENCOUNTER — PATIENT OUTREACH (OUTPATIENT)
Dept: OTHER | Facility: OTHER | Age: 64
End: 2019-03-26

## 2019-03-26 NOTE — PROGRESS NOTES
Last 5 Patient Entered Readings                                      Current 30 Day Average: 115/68     Recent Readings 3/24/2019 3/18/2019 3/18/2019 3/12/2019 3/7/2019    SBP (mmHg) 109 91 102 130 132    DBP (mmHg) 70 52 57 85 78    Pulse 72 76 71 70 59        Digital Medicine: Health  Follow Up    Lifestyle Modifications:    1.Dietary Modifications (Sodium intake <2,000mg/day, food labels, dining out): Patient reports he is still watching his sodium intake closely.     2.Physical Activity: Patient reports he has been going to the gym 3-4 times a week.     3.Medication Therapy: Patient has been compliant with the medication regimen.    4.Patient has the following medication side effects/concerns: Patient denies any s/s of hypotension (dizziness, LH, nausea, or fatigue) with low readings. Patient denies any s/s of hypertension (CP,SOB,severe headaches, or change in vision) with high readings.   (Frequency/Alleviating factors/Precipitating factors, etc.)     Follow up with Mr. Kennedy Cervantes completed. No further questions or concerns.     Plan: Will continue to follow up to achieve health goals.

## 2019-03-27 ENCOUNTER — PATIENT MESSAGE (OUTPATIENT)
Dept: INTERNAL MEDICINE | Facility: CLINIC | Age: 64
End: 2019-03-27

## 2019-03-27 DIAGNOSIS — R73.03 PREDIABETES: Chronic | ICD-10-CM

## 2019-03-27 DIAGNOSIS — E78.2 MIXED DYSLIPIDEMIA: Chronic | ICD-10-CM

## 2019-03-29 RX ORDER — ATORVASTATIN CALCIUM 40 MG/1
40 TABLET, FILM COATED ORAL NIGHTLY
Qty: 90 TABLET | Refills: 0 | Status: SHIPPED | OUTPATIENT
Start: 2019-03-29 | End: 2019-06-21 | Stop reason: SDUPTHER

## 2019-03-29 RX ORDER — METFORMIN HYDROCHLORIDE 500 MG/1
500 TABLET, EXTENDED RELEASE ORAL NIGHTLY
Qty: 90 TABLET | Refills: 0 | Status: SHIPPED | OUTPATIENT
Start: 2019-03-29 | End: 2019-06-21 | Stop reason: ALTCHOICE

## 2019-03-29 NOTE — TELEPHONE ENCOUNTER
Called and left a message for patient that Dr. Patterson filled the two prescriptions requested and that he will need a follow up appointment scheduled for any further refills.

## 2019-05-03 ENCOUNTER — PATIENT OUTREACH (OUTPATIENT)
Dept: OTHER | Facility: OTHER | Age: 64
End: 2019-05-03

## 2019-05-03 NOTE — PROGRESS NOTES
Last 5 Patient Entered Readings                                      Current 30 Day Average: 105/62     Recent Readings 4/30/2019 4/21/2019 4/13/2019 4/13/2019 4/7/2019    SBP (mmHg) 104 110 94 96 113    DBP (mmHg) 67 68 54 56 67    Pulse 77 64 66 66 61      LMFCB for f/u. He has remained at goal.

## 2019-05-16 ENCOUNTER — PATIENT OUTREACH (OUTPATIENT)
Dept: OTHER | Facility: OTHER | Age: 64
End: 2019-05-16

## 2019-05-16 NOTE — PROGRESS NOTES
Last 5 Patient Entered Readings                                      Current 30 Day Average: 113/72     Recent Readings 5/7/2019 4/30/2019 4/21/2019 4/13/2019 4/13/2019    SBP (mmHg) 126 104 110 94 96    DBP (mmHg) 81 67 68 54 56    Pulse 68 77 64 66 66          Patient is on vacation.  I will put on hiatus until he returns.

## 2019-05-23 ENCOUNTER — TELEPHONE (OUTPATIENT)
Dept: INTERNAL MEDICINE | Facility: CLINIC | Age: 64
End: 2019-05-23

## 2019-05-23 NOTE — TELEPHONE ENCOUNTER
Spoke with patient per Dr. Patterson and he stated that he is not needing a replacement for the chlorthalidone and that he is currently taking the medication and has not missed doses. He said he gets his blood pressure medications through the digital medicine program. He is on vacation this week and will schedule an appointment for sometime in June when he gets back.

## 2019-05-23 NOTE — TELEPHONE ENCOUNTER
I received a fax from his pharmacy requesting a therapeutic interchange for his chlorthalidone because prescription is costing him 11 dollars, and he wants something cheaper.  Review of his prescription record indicates that he should have run out of his chlorthalidone three and half months ago, if he had been taking it as instructed.    ACTION NEEDED: Please contact him and schedule him appointment with me soon.  Thanks.

## 2019-06-03 NOTE — PROGRESS NOTES
Last 5 Patient Entered Readings                                      Current 30 Day Average: 132/87     Recent Readings 5/19/2019 5/7/2019 4/30/2019 4/21/2019 4/13/2019    SBP (mmHg) 137 126 104 110 94    DBP (mmHg) 92 81 67 68 54    Pulse 75 68 77 64 66        Digital Medicine: Health  Follow Up    Patient states that higher BP on 5/19 was due to taking his reading before taking his BP meds.    Lifestyle Modifications:    1.Dietary Modifications (Sodium intake <2,000mg/day, food labels, dining out): Patient states that he continues to watch his sodium intake.    2.Physical Activity: Patient continues to exercise regularly at the gym as well as walking.    3.Medication Therapy: Patient has been compliant with the medication regimen.    4.Patient has the following medication side effects/concerns: None reported  (Frequency/Alleviating factors/Precipitating factors, etc.)     Follow up with Mr. Kennedy Cervantes completed. No further questions or concerns. Will continue to follow up to achieve health goals.

## 2019-06-05 NOTE — PROGRESS NOTES
Last 5 Patient Entered Readings                                      Current 30 Day Average: 131/84     Recent Readings 6/4/2019 5/19/2019 5/7/2019 4/30/2019 4/21/2019    SBP (mmHg) 129 137 126 104 110    DBP (mmHg) 79 92 81 67 68    Pulse 62 75 68 77 64      HPI:  Called patient to follow up on his close to goal readings.   Patient endorses adherence to medication regimen.   Patient denies hypotensive s/sx (lightheadedness, dizziness, nausea, fatigue); patient denies hypertensive s/sx (SOB, CP, severe headaches, changes in vision). Instructed patient to seek medical care if BP > 180/110 and is accompanied by hypertensive s/sx associated, patient confirms understanding.     Assessment:  Reviewed recent readings. Per 2017 ACC/ AHA HTN guidelines (goal of BP < 130/80), current 30-day average is close to well controlled.     Plan:  Continue current medication regimen.  Resume your normal eating and exercise habits post-vacation.  I will continue to monitor regularly and will follow-up in 4 weeks, sooner if blood pressure begins to trend upward or downward.     Current medication regimen:  Hypertension Medications             amLODIPine (NORVASC) 10 MG tablet Take 1 tablet (10 mg total) by mouth once daily.    chlorthalidone (HYGROTEN) 25 MG Tab Take 1 tablet (25 mg total) by mouth once daily.    doxazosin (CARDURA) 2 MG tablet Take 1 tablet (2 mg total) by mouth every evening.    irbesartan (AVAPRO) 300 MG tablet Take 1 tablet (300 mg total) by mouth once daily.    metoprolol succinate (TOPROL-XL) 100 MG 24 hr tablet Take 1 tablet (100 mg total) by mouth once daily.          Patient denies having questions or concerns. Patient has my contact information and knows to call with any concerns or clinical changes.

## 2019-06-20 DIAGNOSIS — E78.2 MIXED DYSLIPIDEMIA: Chronic | ICD-10-CM

## 2019-06-20 DIAGNOSIS — R73.03 PREDIABETES: Chronic | ICD-10-CM

## 2019-06-21 ENCOUNTER — LAB VISIT (OUTPATIENT)
Dept: LAB | Facility: HOSPITAL | Age: 64
End: 2019-06-21
Attending: FAMILY MEDICINE
Payer: COMMERCIAL

## 2019-06-21 ENCOUNTER — OFFICE VISIT (OUTPATIENT)
Dept: INTERNAL MEDICINE | Facility: CLINIC | Age: 64
End: 2019-06-21
Payer: COMMERCIAL

## 2019-06-21 VITALS
DIASTOLIC BLOOD PRESSURE: 76 MMHG | SYSTOLIC BLOOD PRESSURE: 116 MMHG | WEIGHT: 272.94 LBS | OXYGEN SATURATION: 97 % | BODY MASS INDEX: 32.23 KG/M2 | HEIGHT: 77 IN | HEART RATE: 63 BPM | TEMPERATURE: 97 F

## 2019-06-21 DIAGNOSIS — E78.2 MIXED DYSLIPIDEMIA: Chronic | ICD-10-CM

## 2019-06-21 DIAGNOSIS — I10 ESSENTIAL HYPERTENSION, BENIGN: Chronic | ICD-10-CM

## 2019-06-21 DIAGNOSIS — H61.21 EXCESSIVE CERUMEN IN EAR CANAL, RIGHT: ICD-10-CM

## 2019-06-21 DIAGNOSIS — Z12.5 SCREENING PSA (PROSTATE SPECIFIC ANTIGEN): ICD-10-CM

## 2019-06-21 DIAGNOSIS — R35.1 BPH ASSOCIATED WITH NOCTURIA: Chronic | ICD-10-CM

## 2019-06-21 DIAGNOSIS — R73.01 IMPAIRED FASTING GLUCOSE: Chronic | ICD-10-CM

## 2019-06-21 DIAGNOSIS — N40.1 BPH ASSOCIATED WITH NOCTURIA: Chronic | ICD-10-CM

## 2019-06-21 DIAGNOSIS — E78.2 MIXED DYSLIPIDEMIA: Primary | Chronic | ICD-10-CM

## 2019-06-21 DIAGNOSIS — F33.41 RECURRENT MAJOR DEPRESSION IN PARTIAL REMISSION: Chronic | ICD-10-CM

## 2019-06-21 DIAGNOSIS — R73.03 PREDIABETES: Chronic | ICD-10-CM

## 2019-06-21 DIAGNOSIS — E66.09 CLASS 1 OBESITY DUE TO EXCESS CALORIES WITH SERIOUS COMORBIDITY AND BODY MASS INDEX (BMI) OF 32.0 TO 32.9 IN ADULT: Chronic | ICD-10-CM

## 2019-06-21 DIAGNOSIS — R73.09 ABNORMAL GLUCOSE: ICD-10-CM

## 2019-06-21 LAB
ALBUMIN SERPL BCP-MCNC: 4.2 G/DL (ref 3.5–5.2)
ALP SERPL-CCNC: 72 U/L (ref 55–135)
ALT SERPL W/O P-5'-P-CCNC: 24 U/L (ref 10–44)
ANION GAP SERPL CALC-SCNC: 11 MMOL/L (ref 8–16)
AST SERPL-CCNC: 19 U/L (ref 10–40)
BILIRUB SERPL-MCNC: 0.8 MG/DL (ref 0.1–1)
BUN SERPL-MCNC: 20 MG/DL (ref 8–23)
CALCIUM SERPL-MCNC: 10 MG/DL (ref 8.7–10.5)
CHLORIDE SERPL-SCNC: 99 MMOL/L (ref 95–110)
CHOLEST SERPL-MCNC: 162 MG/DL (ref 120–199)
CHOLEST/HDLC SERPL: 3 {RATIO} (ref 2–5)
CO2 SERPL-SCNC: 27 MMOL/L (ref 23–29)
COMPLEXED PSA SERPL-MCNC: 0.77 NG/ML (ref 0–4)
CREAT SERPL-MCNC: 1.1 MG/DL (ref 0.5–1.4)
EST. GFR  (AFRICAN AMERICAN): >60 ML/MIN/1.73 M^2
EST. GFR  (NON AFRICAN AMERICAN): >60 ML/MIN/1.73 M^2
GLUCOSE SERPL-MCNC: 97 MG/DL (ref 70–110)
HDLC SERPL-MCNC: 54 MG/DL (ref 40–75)
HDLC SERPL: 33.3 % (ref 20–50)
LDLC SERPL CALC-MCNC: 91.2 MG/DL (ref 63–159)
NONHDLC SERPL-MCNC: 108 MG/DL
POTASSIUM SERPL-SCNC: 4.3 MMOL/L (ref 3.5–5.1)
PROT SERPL-MCNC: 7.5 G/DL (ref 6–8.4)
SODIUM SERPL-SCNC: 137 MMOL/L (ref 136–145)
TRIGL SERPL-MCNC: 84 MG/DL (ref 30–150)

## 2019-06-21 PROCEDURE — 84153 ASSAY OF PSA TOTAL: CPT

## 2019-06-21 PROCEDURE — 99999 PR PBB SHADOW E&M-EST. PATIENT-LVL III: CPT | Mod: PBBFAC,,, | Performed by: FAMILY MEDICINE

## 2019-06-21 PROCEDURE — 69209 PR REMOVAL IMPACTED CERUMEN USING IRRIGATION/LAVAGE, UNILATERAL: ICD-10-PCS | Mod: RT,S$GLB,, | Performed by: FAMILY MEDICINE

## 2019-06-21 PROCEDURE — 69209 REMOVE IMPACTED EAR WAX UNI: CPT | Mod: RT,S$GLB,, | Performed by: FAMILY MEDICINE

## 2019-06-21 PROCEDURE — 80061 LIPID PANEL: CPT

## 2019-06-21 PROCEDURE — 99999 PR PBB SHADOW E&M-EST. PATIENT-LVL III: ICD-10-PCS | Mod: PBBFAC,,, | Performed by: FAMILY MEDICINE

## 2019-06-21 PROCEDURE — 36415 COLL VENOUS BLD VENIPUNCTURE: CPT

## 2019-06-21 PROCEDURE — 80053 COMPREHEN METABOLIC PANEL: CPT

## 2019-06-21 PROCEDURE — 99214 PR OFFICE/OUTPT VISIT, EST, LEVL IV, 30-39 MIN: ICD-10-PCS | Mod: 25,S$GLB,, | Performed by: FAMILY MEDICINE

## 2019-06-21 PROCEDURE — 99214 OFFICE O/P EST MOD 30 MIN: CPT | Mod: 25,S$GLB,, | Performed by: FAMILY MEDICINE

## 2019-06-21 RX ORDER — METFORMIN HYDROCHLORIDE 500 MG/1
500 TABLET, EXTENDED RELEASE ORAL NIGHTLY
Qty: 90 TABLET | Refills: 0 | OUTPATIENT
Start: 2019-06-21

## 2019-06-21 RX ORDER — ATORVASTATIN CALCIUM 40 MG/1
40 TABLET, FILM COATED ORAL NIGHTLY
Qty: 90 TABLET | Refills: 0 | OUTPATIENT
Start: 2019-06-21

## 2019-06-21 RX ORDER — ATORVASTATIN CALCIUM 40 MG/1
40 TABLET, FILM COATED ORAL NIGHTLY
Qty: 90 TABLET | Refills: 2 | Status: SHIPPED | OUTPATIENT
Start: 2019-06-21 | End: 2020-02-05 | Stop reason: SDUPTHER

## 2019-06-21 NOTE — PROGRESS NOTES
CHIEF COMPLAINT  Medication Review and Cerumen Impaction (Right)      HISTORY, ASSESSMENT, and PLAN    1. Mixed dyslipidemia    2. Essential hypertension, benign    3. Impaired fasting glucose    4. Prediabetes    5. Abnormal glucose    6. Class 1 obesity due to excess calories with serious comorbidity and body mass index (BMI) of 32.0 to 32.9 in adult    7. Recurrent major depression in partial remission    8. Excessive cerumen in ear canal, right    9. BPH associated with nocturia    10. Screening PSA (prostate specific antigen)      Orders Placed This Encounter    Lipid panel    Comprehensive metabolic panel    PSA, Screening    atorvastatin (LIPITOR) 40 MG tablet       Problem List Items Addressed This Visit        Psychiatric    Recurrent major depression in partial remission (Chronic)    Current Assessment & Plan     Well controlled.  He wants to continue current treatment.            ENT    Excessive cerumen in ear canal, right    Current Assessment & Plan     He has tried over-the-counter earwax removal kit twice, but still has significant amount of excess cerumen in right ear canal.   Irrigation procedure cleared excessive cerumen. I educated him on ear hygiene.  I instructed him to use half-strength white vinegar to fill ear canal 3 times daily for the next three days.            Cardiac/Vascular    Essential hypertension, benign (Chronic)    Current Assessment & Plan     Well controlled.         Relevant Orders    Lipid panel (Completed)    Mixed dyslipidemia - Primary (Chronic)    Current Assessment & Plan     He appears to be tolerating statin for dyslipidemia without apparent symptoms of myositis or hepatic dysfunction.          Relevant Medications    atorvastatin (LIPITOR) 40 MG tablet    Other Relevant Orders    Lipid panel (Completed)    Comprehensive metabolic panel (Completed)       Renal/    BPH associated with nocturia (Chronic)    Current Assessment & Plan     Not on doxazosin. Symptoms  not severe enough that he wants treatment.            Endocrine    RESOLVED: Abnormal glucose    Current Assessment & Plan     Resolved with therapeutic lifestyle changes and weight loss.         Class 1 obesity due to excess calories with serious comorbidity and body mass index (BMI) of 32.0 to 32.9 in adult (Chronic)    Current Assessment & Plan     Wt Readings from Last 5 Encounters:   06/21/19 123.8 kg (272 lb 14.9 oz)   12/04/18 135.7 kg (299 lb 2.6 oz)   06/07/18 127.5 kg (281 lb)   04/24/18 133.4 kg (294 lb 1.5 oz)   06/26/17 131.1 kg (289 lb 0.4 oz)   Body mass index is 32.36 kg/m².   MUCH IMPROVED with therapeutic lifestyle changes.         RESOLVED: Impaired fasting glucose (Chronic)    Current Assessment & Plan     Resolved with therapeutic lifestyle changes and weight loss.         RESOLVED: Prediabetes (Chronic)    Current Assessment & Plan     Resolved with therapeutic lifestyle changes and weight loss.           Other Visit Diagnoses     Screening PSA (prostate specific antigen)        Relevant Orders    PSA, Screening (Completed)           Outpatient Medications Prior to Visit   Medication Sig Dispense Refill    amLODIPine (NORVASC) 10 MG tablet Take 1 tablet (10 mg total) by mouth once daily. 90 tablet 3    chlorthalidone (HYGROTEN) 25 MG Tab Take 1 tablet (25 mg total) by mouth once daily. 90 tablet 3    citalopram (CELEXA) 20 MG tablet Take 1 tablet (20 mg total) by mouth once daily. 90 tablet 3    irbesartan (AVAPRO) 300 MG tablet Take 1 tablet (300 mg total) by mouth once daily. 90 tablet 3    metoprolol succinate (TOPROL-XL) 100 MG 24 hr tablet Take 1 tablet (100 mg total) by mouth once daily. 90 tablet 3    atorvastatin (LIPITOR) 40 MG tablet Take 1 tablet (40 mg total) by mouth nightly. - APPOINTMENT REQUIRED FOR MORE REFILLS 90 tablet 0    metFORMIN (GLUCOPHAGE-XR) 500 MG 24 hr tablet Take 1 tablet (500 mg total) by mouth every evening. - APPOINTMENT REQUIRED FOR MORE REFILLS 90  tablet 0    aspirin (ECOTRIN) 81 MG EC tablet Take 1 tablet (81 mg total) by mouth once daily. 90 tablet 3    cholecalciferol, vitamin D3, (VITAMIN D3) 2,000 unit Cap Take 1 capsule by mouth once daily.      doxazosin (CARDURA) 2 MG tablet Take 1 tablet (2 mg total) by mouth every evening. 90 tablet 3    MV,MINERALS/FA/LYCOPENE/GINKGO (DAILY MULTIPLE FOR MEN 50+ ORAL) Take 1 tablet by mouth once daily.      omega-3 fatty acids-vitamin E (FISH OIL) 1,000 mg Cap Take 1 capsule by mouth once daily.      lidocaine HCL 10 mg/ml (1%) injection 5 mL        No facility-administered medications prior to visit.         Medications Ordered This Encounter   Medications    atorvastatin (LIPITOR) 40 MG tablet     Sig: Take 1 tablet (40 mg total) by mouth nightly.     Dispense:  90 tablet     Refill:  2       Medications Discontinued During This Encounter   Medication Reason    omega-3 fatty acids-vitamin E (FISH OIL) 1,000 mg Cap Patient no longer taking    MV,MINERALS/FA/LYCOPENE/GINKGO (DAILY MULTIPLE FOR MEN 50+ ORAL) Patient no longer taking    cholecalciferol, vitamin D3, (VITAMIN D3) 2,000 unit Cap Patient no longer taking    metFORMIN (GLUCOPHAGE-XR) 500 MG 24 hr tablet Therapy completed    lidocaine HCL 10 mg/ml (1%) injection 5 mL     doxazosin (CARDURA) 2 MG tablet Patient no longer taking    aspirin (ECOTRIN) 81 MG EC tablet Patient no longer taking    atorvastatin (LIPITOR) 40 MG tablet Reorder          PROCEDURE NOTE    *PROCEDURE: Cerumen Disimpaction, RIGHT  *SURGEON: VEE Patterson MD  *DIAGNOSIS: Cerumen impaction, RIGHT  *INFORMED CONSENT:  After discussing diagnosis, nature and risks/benefits of procedure, and treatment alternatives, informed consent was obtained verbally.    *PRE-PROCEDURE SAFETY CHECK:  (1) Patient was patient identified by at least 2 means.  (2) Planned procedure was confirmed.  (3) Correct site was verified.  (4) Anticipated needed supplies were verified as  "available.    *PROCEDURE DESCRIPTION: Cerumen disimpaction was performed using irrigation.    *POST-PROCEDURE EXAM: Canals unobstructed. Visualized tympanic membranes intact.    *COMPLICATIONS: none    Ear hygiene education provided.       Follow up in about 7 months (around 1/6/2020) for wellness and preventive services.    REVIEW OF SYSTEMS  CARDIOVASCULAR: No angina or orthopnea reported.   ENDOCRINE: No polyuria or polydipsia reported.   PSYCHIATRIC: No mood instability reported.     PHYSICAL EXAM  Vitals:    06/21/19 0829   BP: 116/76   BP Location: Left arm   Patient Position: Sitting   Pulse: 63   Temp: 97.2 °F (36.2 °C)   TempSrc: Tympanic   SpO2: 97%   Weight: 123.8 kg (272 lb 14.9 oz)   Height: 6' 5" (1.956 m)     CONSTITUTIONAL: Vital signs noted. No apparent distress. Does not appear acutely ill or septic. Appears adequately hydrated.  HEENT: External ENT grossly unremarkable.  Left ear canal and tympanic membrane unremarkable.  Right ear canal initial exam revealed excessive cerumen. Hearing grossly intact. Oropharynx moist.  PULM: Lungs clear. Breathing unlabored.  HEART: Auscultation reveals regular rate and rhythm without murmur, gallop or rub.  DERM: Skin warm and moist with normal turgor.  NEURO: There are no gross focal motor deficits or gross deficits of cranial nerves III-XII.  PSYCHIATRIC: Alert and conversant. Mood grossly neutral. Judgment and insight not grossly compromised.     Documentation entered by me for this encounter may have been done in part using speech-recognition technology. Although I have made an effort to ensure accuracy, "sound like" errors may exist and should be interpreted in context. -VEE Patterson MD.   "

## 2019-06-21 NOTE — ASSESSMENT & PLAN NOTE
Wt Readings from Last 5 Encounters:   06/21/19 123.8 kg (272 lb 14.9 oz)   12/04/18 135.7 kg (299 lb 2.6 oz)   06/07/18 127.5 kg (281 lb)   04/24/18 133.4 kg (294 lb 1.5 oz)   06/26/17 131.1 kg (289 lb 0.4 oz)   Body mass index is 32.36 kg/m².   MUCH IMPROVED with therapeutic lifestyle changes.

## 2019-06-21 NOTE — ASSESSMENT & PLAN NOTE
He has tried over-the-counter earwax removal kit twice, but still has significant amount of excess cerumen in right ear canal.   Irrigation procedure cleared excessive cerumen. I educated him on ear hygiene.  I instructed him to use half-strength white vinegar to fill ear canal 3 times daily for the next three days.

## 2019-07-08 ENCOUNTER — PATIENT OUTREACH (OUTPATIENT)
Dept: OTHER | Facility: OTHER | Age: 64
End: 2019-07-08

## 2019-07-08 NOTE — PROGRESS NOTES
Last 5 Patient Entered Readings                                      Current 30 Day Average: 125/82     Recent Readings 6/30/2019 6/20/2019 6/11/2019 6/4/2019 5/19/2019    SBP (mmHg) 125 128 121 129 137    DBP (mmHg) 81 87 79 79 92    Pulse 79 71 61 62 75          Digital Medicine: Health  Follow Up    Patient reports the higher readings were due to taking his reading before taking his medication.    Lifestyle Modifications:    1.Dietary Modifications (Sodium intake <2,000mg/day, food labels, dining out): Patient states that he has been working on limiting portions while continuing to limit the amount of salt in his diet.  He is drinking at least 48 oz of water per day as well as unsweetened sparkling water.    2.Physical Activity: Patient is working out at the gym 4 - 5 days per week doing cardio and weights.    3.Medication Therapy: Patient has been compliant with the medication regimen.    4.Patient has the following medication side effects/concerns: none reported.  (Frequency/Alleviating factors/Precipitating factors, etc.)     Follow up with Mr. Kennedy Cervantes completed. No further questions or concerns. Will continue to follow up to achieve health goals.

## 2019-08-05 ENCOUNTER — PATIENT OUTREACH (OUTPATIENT)
Dept: OTHER | Facility: OTHER | Age: 64
End: 2019-08-05

## 2019-08-05 NOTE — PROGRESS NOTES
Last 5 Patient Entered Readings                                      Current 30 Day Average: 121/71     Recent Readings 8/4/2019 7/28/2019 7/20/2019 7/19/2019 7/18/2019    SBP (mmHg) 131 127 104 113 133    DBP (mmHg) 79 66 59 65 78    Pulse 70 67 74 69 57        Digital Medicine: Health  Follow Up    Left voicemail to follow up with Mr. Kennedy Cervantes.  Current BP average 121/71 mmHg is at goal, 130/80 mmHg.

## 2019-08-14 NOTE — PROGRESS NOTES
Last 5 Patient Entered Readings                                      Current 30 Day Average: 124/69     Recent Readings 8/11/2019 8/4/2019 7/28/2019 7/20/2019 7/19/2019    SBP (mmHg) 137 131 127 104 113    DBP (mmHg) 65 79 66 59 65    Pulse 55 70 67 74 69          Digital Medicine: Health  Follow Up    Left voicemail to follow up with Mr. Kennedy Cervantes.  Current BP average 124/69 mmHg is at goal,130/80 mmHg.

## 2019-08-23 NOTE — PROGRESS NOTES
Last 5 Patient Entered Readings                                      Current 30 Day Average: 131/70     Recent Readings 8/17/2019 8/11/2019 8/4/2019 7/28/2019 7/20/2019    SBP (mmHg) 130 137 131 127 104    DBP (mmHg) 71 65 79 66 59    Pulse 75 55 70 67 74        Digital Medicine: Health  Follow Up    Left voicemail to follow up with Mr. Kennedy Cervantes.  Current BP average 131/70 mmHg is not at goal, 130/80 mmHg.

## 2019-09-03 NOTE — PROGRESS NOTES
Last 5 Patient Entered Readings                                      Current 30 Day Average: 125/70     Recent Readings 8/31/2019 8/25/2019 8/17/2019 8/11/2019 8/4/2019    SBP (mmHg) 126 100 130 137 131    DBP (mmHg) 71 63 71 65 79    Pulse 69 68 75 55 70            Digital Medicine: Health  Follow Up    Left voicemail to follow up with Mr. Kennedy Cervantes.  Current BP average 125/70 mmHg is at goal, 130/80 mmHg.

## 2019-09-10 NOTE — PROGRESS NOTES
Digital Medicine: Health  Follow-Up    HPI        Diet:   Patient reports eating or drinking the following: fruit, water and fresh vegetablesHe has the following dietary restrictions: low sodium diet    Patient did not have times when they could not afford food or ran out.  He gets groceries from the grocery store.      Physical Activity:   When asked if exercising, patient responded: yes    He exercises for 40 minutes per day 5 day(s) a week.      Patient participates in the following activities: elliptical      SDOH    Intervention/Plan    There are no preventive care reminders to display for this patient.    Last 5 Patient Entered Readings                                      Current 30 Day Average: 119/60     Recent Readings 9/8/2019 9/8/2019 9/8/2019 8/31/2019 8/25/2019    SBP (mmHg) 101 104 188 126 100    DBP (mmHg) 53 58 41 71 63    Pulse 70 73 78 69 68

## 2019-09-12 ENCOUNTER — HOSPITAL ENCOUNTER (OUTPATIENT)
Dept: RADIOLOGY | Facility: HOSPITAL | Age: 64
Discharge: HOME | End: 2019-09-12
Attending: FAMILY MEDICINE
Payer: COMMERCIAL

## 2019-09-12 ENCOUNTER — OFFICE VISIT (OUTPATIENT)
Dept: INTERNAL MEDICINE | Facility: CLINIC | Age: 64
End: 2019-09-12
Payer: COMMERCIAL

## 2019-09-12 VITALS
HEIGHT: 77 IN | BODY MASS INDEX: 32.64 KG/M2 | TEMPERATURE: 98 F | HEART RATE: 69 BPM | RESPIRATION RATE: 16 BRPM | SYSTOLIC BLOOD PRESSURE: 128 MMHG | OXYGEN SATURATION: 95 % | WEIGHT: 276.44 LBS | DIASTOLIC BLOOD PRESSURE: 88 MMHG

## 2019-09-12 DIAGNOSIS — R05.9 COUGH: Primary | ICD-10-CM

## 2019-09-12 DIAGNOSIS — J22 LOWER RESPIRATORY INFECTION: ICD-10-CM

## 2019-09-12 DIAGNOSIS — R05.9 COUGH: ICD-10-CM

## 2019-09-12 PROCEDURE — 71046 XR CHEST PA AND LATERAL: ICD-10-PCS | Mod: 26,,, | Performed by: RADIOLOGY

## 2019-09-12 PROCEDURE — 71046 X-RAY EXAM CHEST 2 VIEWS: CPT | Mod: TC

## 2019-09-12 PROCEDURE — 99213 PR OFFICE/OUTPT VISIT, EST, LEVL III, 20-29 MIN: ICD-10-PCS | Mod: S$GLB,,, | Performed by: FAMILY MEDICINE

## 2019-09-12 PROCEDURE — 99999 PR PBB SHADOW E&M-EST. PATIENT-LVL IV: CPT | Mod: PBBFAC,,, | Performed by: FAMILY MEDICINE

## 2019-09-12 PROCEDURE — 71046 X-RAY EXAM CHEST 2 VIEWS: CPT | Mod: 26,,, | Performed by: RADIOLOGY

## 2019-09-12 PROCEDURE — 99213 OFFICE O/P EST LOW 20 MIN: CPT | Mod: S$GLB,,, | Performed by: FAMILY MEDICINE

## 2019-09-12 PROCEDURE — 99999 PR PBB SHADOW E&M-EST. PATIENT-LVL IV: ICD-10-PCS | Mod: PBBFAC,,, | Performed by: FAMILY MEDICINE

## 2019-09-12 RX ORDER — PREDNISONE 5 MG/1
TABLET ORAL
Qty: 20 TABLET | Refills: 0 | Status: SHIPPED | OUTPATIENT
Start: 2019-09-12 | End: 2020-02-05

## 2019-09-12 RX ORDER — CODEINE PHOSPHATE AND GUAIFENESIN 10; 100 MG/5ML; MG/5ML
5 SOLUTION ORAL 3 TIMES DAILY PRN
Qty: 236 ML | Refills: 0 | Status: SHIPPED | OUTPATIENT
Start: 2019-09-12 | End: 2019-09-22

## 2019-09-12 RX ORDER — BENZONATATE 200 MG/1
200 CAPSULE ORAL 3 TIMES DAILY PRN
Qty: 30 CAPSULE | Refills: 0 | Status: SHIPPED | OUTPATIENT
Start: 2019-09-12 | End: 2019-09-22

## 2019-09-16 ENCOUNTER — PATIENT OUTREACH (OUTPATIENT)
Dept: OTHER | Facility: OTHER | Age: 64
End: 2019-09-16

## 2019-09-16 DIAGNOSIS — I10 ESSENTIAL HYPERTENSION, BENIGN: Chronic | ICD-10-CM

## 2019-09-16 RX ORDER — METOPROLOL SUCCINATE 100 MG/1
TABLET, EXTENDED RELEASE ORAL
Qty: 90 TABLET | Refills: 3
Start: 2019-09-16 | End: 2020-02-05 | Stop reason: SDUPTHER

## 2019-09-16 NOTE — PROGRESS NOTES
Digital Medicine: Clinician Follow-Up    Patient has been in bed with a bad case of bronchitis. He is taking a steroid and cough syrup. He denies taking a decongestant.     The history is provided by the patient.     Follow Up  Follow-up reason(s): reading review and medication change      Alert received. Care Team received low BP alert.  Patient had lightheadedness associated with his lower readings 9/14.                   Medication Adherence:   He misses doses: never        INTERVENTION(S)  reviewed appropriate dose schedule and recommended med change    PLAN  patient verbalizes understanding and patient amenable to changes    Patient will reduce metoprolol from 100mg daily to 50mg daily. He will maintain his other HTN medications.    Patient will inform me of any low BP symptoms.      There are no preventive care reminders to display for this patient.    Last 5 Patient Entered Readings                                      Current 30 Day Average: 107/64     Recent Readings 9/14/2019 9/14/2019 9/14/2019 9/8/2019 9/8/2019    SBP (mmHg) 79 89 121 101 104    DBP (mmHg) 51 49 117 53 58    Pulse 69 67 73 70 73             Hypertension Medications             amLODIPine (NORVASC) 10 MG tablet Take 1 tablet (10 mg total) by mouth once daily.    chlorthalidone (HYGROTEN) 25 MG Tab Take 1 tablet (25 mg total) by mouth once daily.    irbesartan (AVAPRO) 300 MG tablet Take 1 tablet (300 mg total) by mouth once daily.    metoprolol succinate (TOPROL-XL) 100 MG 24 hr tablet Take 1 tablet (100 mg total) by mouth once daily.

## 2019-09-22 ENCOUNTER — TELEPHONE (OUTPATIENT)
Dept: INTERNAL MEDICINE | Facility: CLINIC | Age: 64
End: 2019-09-22

## 2019-09-23 NOTE — PROGRESS NOTES
CHIEF COMPLAINT  Cough (1 week)    HISTORY, ASSESSMENT, and PLAN    1. Cough    2. Lower respiratory infection      I educated him on the apparently viral nature of this acute illness, how the management was largely supportive and symptom focused. We discussed the risks and benefits of treatment options, and he feels that the severity of his symptoms is sufficient to warrant the treatment prescribed. I discouraged the use of over-the-counter cough and cold medications. I told him to expect gradual improvement and resolution of symptoms over the next week or two, and I instructed him to let me know if his illness failed to follow this expected course.     Orders Placed This Encounter    X-Ray Chest PA And Lateral    benzonatate (TESSALON) 200 MG capsule    guaifenesin-codeine 100-10 mg/5 ml (TUSSI-ORGANIDIN NR)  mg/5 mL syrup    predniSONE (DELTASONE) 5 MG tablet       Problem List Items Addressed This Visit     None      Visit Diagnoses     Cough    -  Primary    Relevant Orders    X-Ray Chest PA And Lateral (Completed)    Lower respiratory infection        Relevant Medications    predniSONE (DELTASONE) 5 MG tablet    Other Relevant Orders    X-Ray Chest PA And Lateral (Completed)           Outpatient Medications Prior to Visit   Medication Sig Dispense Refill    amLODIPine (NORVASC) 10 MG tablet Take 1 tablet (10 mg total) by mouth once daily. 90 tablet 3    atorvastatin (LIPITOR) 40 MG tablet Take 1 tablet (40 mg total) by mouth nightly. 90 tablet 2    chlorthalidone (HYGROTEN) 25 MG Tab Take 1 tablet (25 mg total) by mouth once daily. 90 tablet 3    citalopram (CELEXA) 20 MG tablet Take 1 tablet (20 mg total) by mouth once daily. 90 tablet 3    irbesartan (AVAPRO) 300 MG tablet Take 1 tablet (300 mg total) by mouth once daily. 90 tablet 3    metoprolol succinate (TOPROL-XL) 100 MG 24 hr tablet Take 1 tablet (100 mg total) by mouth once daily. 90 tablet 3     No facility-administered medications  "prior to visit.         Medications Ordered This Encounter   Medications    benzonatate (TESSALON) 200 MG capsule     Sig: Take 1 capsule (200 mg total) by mouth 3 (three) times daily as needed for Cough.     Dispense:  30 capsule     Refill:  0    guaifenesin-codeine 100-10 mg/5 ml (TUSSI-ORGANIDIN NR)  mg/5 mL syrup     Sig: Take 5 mLs by mouth 3 (three) times daily as needed (BAD COUGH).     Dispense:  236 mL     Refill:  0    predniSONE (DELTASONE) 5 MG tablet     Sig: Take 4 tablets by mouth daily for 2 days, then 3 tablets daily for 2 days, then 2 tablets daily for 2 days, then 1 tablet daily for 2 days     Dispense:  20 tablet     Refill:  0       There are no discontinued medications.     Follow up if symptoms worsen or fail to improve.    REVIEW OF SYSTEMS  CONSTITUTIONAL: No objective fever reported.  PULMONARY: No hemoptysis or difficulty breathing reported.  CARDIOVASCULAR: No chest pain or orthopnea reported.     PHYSICAL EXAM  Vitals:    09/12/19 1623   BP: 128/88   BP Location: Right arm   Patient Position: Sitting   BP Method: Large (Manual)   Pulse: 69   Resp: 16   Temp: 98 °F (36.7 °C)   TempSrc: Oral   SpO2: 95%   Weight: 125.4 kg (276 lb 7.3 oz)   Height: 6' 5" (1.956 m)     CONSTITUTIONAL: Vital signs noted. No apparent distress. Does not appear acutely ill or septic. Appears adequately hydrated.  EYES: Pupils equal and reactive. Extraocular movements intact. Sclerae anicteric. Lids and conjunctiva unremarkable.  ENT: External ENT grossly unremarkable. Ear canals and visualized tympanic membranes are unremarkable. Hearing grossly intact. Nasal mucosa pink. Oropharynx moist. Posterior oropharynx is reasonably symmetric with minimal erythema, but is without lesion, ulceration, or exudate.  NECK: Trachea midline. No significant cervical lymphadenopathy.  PULM: Mild scattered rhonchi with occasional wheeze. Good air movement. Breathing unlabored.  HEART: Auscultation reveals regular rate " "and rhythm.  DERM: Skin warm and moist with normal turgor.     TOTAL TIME evaluating and managing this patient for this encounter exceeded 15 minutes, the majority spent counseling and coordinating care for the listed diagnoses.   Documentation entered by me for this encounter may have been done in part using speech-recognition technology. Although I have made an effort to ensure accuracy, "sound like" errors may exist and should be interpreted in context. -VEE Patterson MD.   "

## 2019-09-23 NOTE — TELEPHONE ENCOUNTER
ACTION NEEDED:  Please read Patient Portal Message (below), then contact him to see how he is doing. If he is not much better, please send me a patient call message marked high priority, and I'll send him in a prescription for antibiotics. Thanks.        Kennedy Adame.    I have tried calling you a couple of times to follow-up on how you were recuperating from your respiratory infection. Both times the call went to voicemail. I did not leave a message.    Please give me an update on how you are doing. If you are not much better by now, I would suggest a round of antibiotics.    Thanks for letting me care for you, thanks for trusting us with your healthcare needs, and thanks for using [facility].    Sincerely,    VEE Patterson MD

## 2019-09-24 ENCOUNTER — PATIENT MESSAGE (OUTPATIENT)
Dept: INTERNAL MEDICINE | Facility: CLINIC | Age: 64
End: 2019-09-24

## 2019-09-30 ENCOUNTER — PATIENT OUTREACH (OUTPATIENT)
Dept: OTHER | Facility: OTHER | Age: 64
End: 2019-09-30

## 2019-09-30 NOTE — PROGRESS NOTES
Digital Medicine: Clinician Follow-Up    Patient is suffering with a URTI.     The history is provided by the patient.     Follow Up  Follow-up reason(s): reading review and medication change follow-up      Readings are trending down   Medication Change: dose decrease    Is patient tolerating med change?:  YesPatient reports less lightheadedness since reducing metoprolol. He is tolerating this change.                  Medication Adherence:   He misses doses: never        INTERVENTION(S)  reviewed appropriate dose schedule    PLAN  patient verbalizes understanding    Patient will continue his current HTN medications.    I stressed contacting me if he experiences any hypotension symptoms. If so, I will stop metoprolol.       There are no preventive care reminders to display for this patient.    Last 5 Patient Entered Readings                                      Current 30 Day Average: 106/64     Recent Readings 9/22/2019 9/14/2019 9/14/2019 9/14/2019 9/8/2019    SBP (mmHg) 117 79 89 121 101    DBP (mmHg) 69 51 49 117 53    Pulse 84 69 67 73 70             Hypertension Medications             amLODIPine (NORVASC) 10 MG tablet Take 1 tablet (10 mg total) by mouth once daily.    chlorthalidone (HYGROTEN) 25 MG Tab Take 1 tablet (25 mg total) by mouth once daily.    irbesartan (AVAPRO) 300 MG tablet Take 1 tablet (300 mg total) by mouth once daily.    metoprolol succinate (TOPROL-XL) 100 MG 24 hr tablet Take 50mg by mouth daily.

## 2019-10-08 ENCOUNTER — PATIENT OUTREACH (OUTPATIENT)
Dept: OTHER | Facility: OTHER | Age: 64
End: 2019-10-08

## 2019-10-08 NOTE — PROGRESS NOTES
"Digital Medicine: Health  Follow-Up    Patient states that he is feeling well.  He is not sure why is BP readings are higher. I asked him to take more readings to make sure he is not continuing to trend higher.    The history is provided by the patient.     Follow Up  Follow-up reason(s): reading review      Readings are trending up           Diet:   He has the following dietary restrictions: low sodium diet    Physical Activity:   When asked if exercising, patient responded: yes    Patient participates in the following activities: weights and cardio    Patient says that he had taken a break from the gym while he was sick, but he is slowly "getting back into the gro[place]".    Medication Adherence:   He misses doses: never    Patient is not selectively taking diuretics.    He does not wonder if medications are working.  He knows purpose of medications.        SDOH    INTERVENTION(S)  encouragement/support    PLAN  patient verbalizes understanding      There are no preventive care reminders to display for this patient.    Last 5 Patient Entered Readings                                      Current 30 Day Average: 109/67     Recent Readings 10/1/2019 10/1/2019 9/22/2019 9/14/2019 9/14/2019    SBP (mmHg) 140 149 117 79 89    DBP (mmHg) 78 90 69 51 49    Pulse 63 61 84 69 67                "

## 2019-11-05 ENCOUNTER — PATIENT OUTREACH (OUTPATIENT)
Dept: OTHER | Facility: OTHER | Age: 64
End: 2019-11-05

## 2019-11-12 NOTE — PROGRESS NOTES
Digital Medicine: Health  Follow-Up    Patient says that he is feeling well and happy with his blood pressure readings.  He reports that he has been out of town and will not return for a few more days.  I put patient on hiatus until his return.    The history is provided by the patient. No  was used.     Follow Up  Follow-up reason(s): reading review      Readings are trending down       INTERVENTION(S)  encouragement/support    PLAN  patient verbalizes understanding      There are no preventive care reminders to display for this patient.    Last 5 Patient Entered Readings                                      Current 30 Day Average: 124/74     Recent Readings 11/3/2019 10/27/2019 10/19/2019 10/9/2019 10/1/2019    SBP (mmHg) 122 105 146 140 140    DBP (mmHg) 77 61 85 71 78    Pulse 86 73 76 54 63                      Diet Screening   No change to diet.  Patient reports eating or drinking the following: water, fresh vegetables and lean proteins, whole grains.He has the following dietary restrictions: low sodium dietHe does not skip meals regularly.  He has no standard fasting periods.      Patient did not have times when they could not afford food or ran out.      Assigning the following patient goals: maintain low sodium diet    Physical Activity Screening   When asked if exercising, patient responded: yes    He exercises for 30 minutes per day 4 day(s) a week.      Patient participates in the following activities: elliptical and weights    He identified the following barriers to physical activity: no barriers to being active    Assigning the following patient goal(s): 150 minutes of exercise per week    Medication Adherence Screening   He misses doses: never    Patient is not selectively taking diuretics.    He does not wonder if medications are working.  He knows purpose of medications.        SDOH

## 2019-11-27 RX ORDER — DOXAZOSIN 2 MG/1
TABLET ORAL
Qty: 90 TABLET | Refills: 2 | Status: SHIPPED | OUTPATIENT
Start: 2019-11-27 | End: 2020-02-05 | Stop reason: SDUPTHER

## 2019-12-31 ENCOUNTER — PATIENT OUTREACH (OUTPATIENT)
Dept: OTHER | Facility: OTHER | Age: 64
End: 2019-12-31

## 2020-01-08 NOTE — PROGRESS NOTES
Digital Medicine: Health  Follow-Up    Patient says he is doing well.  He informed me that he will be retiring at the end of February and will be moving out of state at some point after that.  We discussed steps he may want to take to make his transition of care easier.  I advised that we will continue monitoring until he is no longer getting his medical care from [facility].    The history is provided by the patient. No  was used.     Follow Up  Follow-up reason(s): reading review          INTERVENTION(S)  encouragement/support and denied resources    PLAN  continue monitoring      There are no preventive care reminders to display for this patient.    Last 5 Patient Entered Readings                                      Current 30 Day Average: 132/81     Recent Readings 1/3/2020 12/22/2019 12/10/2019 11/30/2019 11/3/2019    SBP (mmHg) 127 139 131 112 122    DBP (mmHg) 82 83 79 66 77    Pulse 83 66 72 90 86                      Diet Screening   No change to diet.      Assigning the following patient goals: maintain low sodium diet    Physical Activity Screening   No change to exercise routine.        Assigning the following patient goal(s): 150 minutes of exercise per week and participate in exercise weekly    Medication Adherence Screening   He misses doses: never    Patient is not selectively taking diuretics.    He does not wonder if medications are working.  He knows purpose of medications.        SDOH

## 2020-02-05 ENCOUNTER — OFFICE VISIT (OUTPATIENT)
Dept: INTERNAL MEDICINE | Facility: CLINIC | Age: 65
End: 2020-02-05
Payer: COMMERCIAL

## 2020-02-05 ENCOUNTER — HOSPITAL ENCOUNTER (OUTPATIENT)
Dept: RADIOLOGY | Facility: HOSPITAL | Age: 65
Discharge: HOME | End: 2020-02-05
Attending: FAMILY MEDICINE
Payer: COMMERCIAL

## 2020-02-05 ENCOUNTER — EXTERNAL RECORD (OUTPATIENT)
Dept: OTHER | Age: 65
End: 2020-02-05

## 2020-02-05 VITALS
OXYGEN SATURATION: 97 % | HEART RATE: 86 BPM | TEMPERATURE: 96 F | SYSTOLIC BLOOD PRESSURE: 122 MMHG | HEIGHT: 77 IN | WEIGHT: 293 LBS | DIASTOLIC BLOOD PRESSURE: 80 MMHG | BODY MASS INDEX: 34.59 KG/M2

## 2020-02-05 DIAGNOSIS — M47.22 CERVICAL SPONDYLOSIS WITH RADICULOPATHY: ICD-10-CM

## 2020-02-05 DIAGNOSIS — H90.3 SENSORINEURAL HEARING LOSS (SNHL) OF BOTH EARS: Chronic | ICD-10-CM

## 2020-02-05 DIAGNOSIS — Z12.5 SCREENING PSA (PROSTATE SPECIFIC ANTIGEN): ICD-10-CM

## 2020-02-05 DIAGNOSIS — R35.1 BPH ASSOCIATED WITH NOCTURIA: Chronic | ICD-10-CM

## 2020-02-05 DIAGNOSIS — E66.09 CLASS 1 OBESITY DUE TO EXCESS CALORIES WITH SERIOUS COMORBIDITY AND BODY MASS INDEX (BMI) OF 34.0 TO 34.9 IN ADULT: Chronic | ICD-10-CM

## 2020-02-05 DIAGNOSIS — Z00.00 PREVENTATIVE HEALTH CARE: Primary | ICD-10-CM

## 2020-02-05 DIAGNOSIS — Z11.4 SCREENING FOR HIV WITHOUT PRESENCE OF RISK FACTORS: ICD-10-CM

## 2020-02-05 DIAGNOSIS — N40.1 BPH ASSOCIATED WITH NOCTURIA: Chronic | ICD-10-CM

## 2020-02-05 DIAGNOSIS — E78.2 MIXED DYSLIPIDEMIA: Chronic | ICD-10-CM

## 2020-02-05 DIAGNOSIS — I10 ESSENTIAL HYPERTENSION, BENIGN: Chronic | ICD-10-CM

## 2020-02-05 DIAGNOSIS — F33.41 RECURRENT MAJOR DEPRESSION IN PARTIAL REMISSION: Chronic | ICD-10-CM

## 2020-02-05 PROBLEM — H61.21 EXCESSIVE CERUMEN IN EAR CANAL, RIGHT: Status: RESOLVED | Noted: 2019-06-21 | Resolved: 2020-02-05

## 2020-02-05 PROCEDURE — 72040 XR CERVICAL SPINE 2 OR 3 VIEWS: ICD-10-PCS | Mod: 26,,, | Performed by: RADIOLOGY

## 2020-02-05 PROCEDURE — 99999 PR PBB SHADOW E&M-EST. PATIENT-LVL IV: ICD-10-PCS | Mod: PBBFAC,,, | Performed by: FAMILY MEDICINE

## 2020-02-05 PROCEDURE — 99396 PREV VISIT EST AGE 40-64: CPT | Mod: S$GLB,,, | Performed by: FAMILY MEDICINE

## 2020-02-05 PROCEDURE — 99999 PR PBB SHADOW E&M-EST. PATIENT-LVL IV: CPT | Mod: PBBFAC,,, | Performed by: FAMILY MEDICINE

## 2020-02-05 PROCEDURE — 99396 PR PREVENTIVE VISIT,EST,40-64: ICD-10-PCS | Mod: S$GLB,,, | Performed by: FAMILY MEDICINE

## 2020-02-05 PROCEDURE — 72040 X-RAY EXAM NECK SPINE 2-3 VW: CPT | Mod: TC

## 2020-02-05 PROCEDURE — 72040 X-RAY EXAM NECK SPINE 2-3 VW: CPT | Mod: 26,,, | Performed by: RADIOLOGY

## 2020-02-05 RX ORDER — DOXAZOSIN 2 MG/1
2 TABLET ORAL NIGHTLY
Qty: 90 TABLET | Refills: 4 | Status: SHIPPED | OUTPATIENT
Start: 2020-02-05 | End: 2020-07-26

## 2020-02-05 RX ORDER — IRBESARTAN 300 MG/1
300 TABLET ORAL DAILY
Qty: 90 TABLET | Refills: 4 | Status: SHIPPED | OUTPATIENT
Start: 2020-02-05 | End: 2021-02-04

## 2020-02-05 RX ORDER — NAPROXEN 500 MG/1
500 TABLET ORAL 2 TIMES DAILY PRN
Qty: 60 TABLET | Refills: 1 | Status: SHIPPED | OUTPATIENT
Start: 2020-02-05

## 2020-02-05 RX ORDER — METOPROLOL SUCCINATE 100 MG/1
TABLET, EXTENDED RELEASE ORAL
Qty: 90 TABLET | Refills: 4
Start: 2020-02-05 | End: 2020-04-15 | Stop reason: SDUPTHER

## 2020-02-05 RX ORDER — CITALOPRAM 20 MG/1
20 TABLET, FILM COATED ORAL DAILY
Qty: 90 TABLET | Refills: 4 | Status: SHIPPED | OUTPATIENT
Start: 2020-02-05

## 2020-02-05 RX ORDER — CHLORTHALIDONE 25 MG/1
25 TABLET ORAL DAILY
Qty: 90 TABLET | Refills: 4 | Status: SHIPPED | OUTPATIENT
Start: 2020-02-05 | End: 2020-03-12 | Stop reason: DRUGHIGH

## 2020-02-05 RX ORDER — ATORVASTATIN CALCIUM 40 MG/1
40 TABLET, FILM COATED ORAL NIGHTLY
Qty: 90 TABLET | Refills: 4 | Status: SHIPPED | OUTPATIENT
Start: 2020-02-05

## 2020-02-05 RX ORDER — AMLODIPINE BESYLATE 10 MG/1
10 TABLET ORAL DAILY
Qty: 90 TABLET | Refills: 4 | Status: SHIPPED | OUTPATIENT
Start: 2020-02-05

## 2020-02-05 NOTE — PROGRESS NOTES
WELLNESS VISIT (PREVENTIVE SERVICES)    CHIEF COMPLAINT  Annual Exam and Neck Pain      HEALTH MAINTENANCE INTERVENTIONS - UP TO DATE  Health Maintenance Topics with due status: Not Due       Topic Last Completion Date    TETANUS VACCINE 04/21/2017    Colonoscopy 06/07/2018    PROSTATE-SPECIFIC ANTIGEN 06/21/2019    Lipid Panel 06/21/2019       HEALTH MAINTENANCE INTERVENTIONS - DUE OR DUE SOON  Health Maintenance Due   Topic Date Due    HIV Screening  12/28/1970    Shingles Vaccine (2 of 3) 06/16/2017    Influenza Vaccine (1) 09/01/2019       HISTORY, ASSESSMENT, and PLAN    1. Preventative health care    2. Screening PSA (prostate specific antigen)    3. Cervical spondylosis with radiculopathy (left C6)    4. Essential hypertension, benign    5. Recurrent major depression in partial remission    6. Mixed dyslipidemia    7. BPH associated with nocturia    8. Sensorineural hearing loss (SNHL) of both ears    9. Class 1 obesity due to excess calories with serious comorbidity and body mass index (BMI) of 34.0 to 34.9 in adult    10. Screening for HIV without presence of risk factors        Orders Placed This Encounter    X-Ray Cervical Spine 2 or 3 Views    Lipid panel    Comprehensive metabolic panel    PSA, Screening    HIV 1/2 Ag/Ab (4th Gen)    Ambulatory referral/consult to Physical/Occupational Therapy    amLODIPine (NORVASC) 10 MG tablet    chlorthalidone (HYGROTEN) 25 MG Tab    citalopram (CELEXA) 20 MG tablet    doxazosin (CARDURA) 2 MG tablet    irbesartan (AVAPRO) 300 MG tablet    metoprolol succinate (TOPROL-XL) 100 MG 24 hr tablet    atorvastatin (LIPITOR) 40 MG tablet    naproxen (NAPROSYN) 500 MG tablet        Outpatient Medications Prior to Visit   Medication Sig Dispense Refill    amLODIPine (NORVASC) 10 MG tablet Take 1 tablet (10 mg total) by mouth once daily. 90 tablet 3    atorvastatin (LIPITOR) 40 MG tablet Take 1 tablet (40 mg total) by mouth nightly. 90 tablet 2     chlorthalidone (HYGROTEN) 25 MG Tab Take 1 tablet (25 mg total) by mouth once daily. 90 tablet 3    citalopram (CELEXA) 20 MG tablet Take 1 tablet (20 mg total) by mouth once daily. 90 tablet 3    doxazosin (CARDURA) 2 MG tablet TAKE 1 TABLET BY MOUTH EVERY EVENING 90 tablet 2    irbesartan (AVAPRO) 300 MG tablet Take 1 tablet (300 mg total) by mouth once daily. 90 tablet 3    metoprolol succinate (TOPROL-XL) 100 MG 24 hr tablet Take 50mg by mouth daily. 90 tablet 3    predniSONE (DELTASONE) 5 MG tablet Take 4 tablets by mouth daily for 2 days, then 3 tablets daily for 2 days, then 2 tablets daily for 2 days, then 1 tablet daily for 2 days (Patient not taking: Reported on 2/5/2020) 20 tablet 0     No facility-administered medications prior to visit.         Medications Discontinued During This Encounter   Medication Reason    predniSONE (DELTASONE) 5 MG tablet Patient no longer taking    amLODIPine (NORVASC) 10 MG tablet Reorder    chlorthalidone (HYGROTEN) 25 MG Tab Reorder    citalopram (CELEXA) 20 MG tablet Reorder    doxazosin (CARDURA) 2 MG tablet Reorder    irbesartan (AVAPRO) 300 MG tablet Reorder    metoprolol succinate (TOPROL-XL) 100 MG 24 hr tablet Reorder    atorvastatin (LIPITOR) 40 MG tablet Reorder        Medications Ordered This Encounter   Medications    amLODIPine (NORVASC) 10 MG tablet     Sig: Take 1 tablet (10 mg total) by mouth once daily.     Dispense:  90 tablet     Refill:  4    atorvastatin (LIPITOR) 40 MG tablet     Sig: Take 1 tablet (40 mg total) by mouth nightly.     Dispense:  90 tablet     Refill:  4    chlorthalidone (HYGROTEN) 25 MG Tab     Sig: Take 1 tablet (25 mg total) by mouth once daily.     Dispense:  90 tablet     Refill:  4    citalopram (CELEXA) 20 MG tablet     Sig: Take 1 tablet (20 mg total) by mouth once daily.     Dispense:  90 tablet     Refill:  4    doxazosin (CARDURA) 2 MG tablet     Sig: Take 1 tablet (2 mg total) by mouth every evening.      Dispense:  90 tablet     Refill:  4    irbesartan (AVAPRO) 300 MG tablet     Sig: Take 1 tablet (300 mg total) by mouth once daily.     Dispense:  90 tablet     Refill:  4    metoprolol succinate (TOPROL-XL) 100 MG 24 hr tablet     Sig: Take 50mg by mouth daily.     Dispense:  90 tablet     Refill:  4    naproxen (NAPROSYN) 500 MG tablet     Sig: Take 1 tablet (500 mg total) by mouth 2 (two) times daily as needed (for neck pain).     Dispense:  60 tablet     Refill:  1        Follow up in about 1 year (around 2/5/2021), or if symptoms worsen or fail to improve, for wellness and preventive services.    Problem List Items Addressed This Visit        Neuro    Cervical spondylosis with radiculopathy (left C6)    Current Assessment & Plan     Onset reported as about four days ago. Quality described as sharp and aching pain. Location of pain is left neck radiating down left arm in the distribution of the C6 nerve root. Associated symptoms include numbness and tingling in the distal C6 distribution. Exacerbating factors include cervical extension and leftward rotation. Severity of symptoms described as moderately severe at worst, mild to moderate at present. He reports no loss of upper extremity strength. He reports no recent relevant injury. We discussed differential diagnosis and risks and benefits of treatment options. He will let me know if current treatment plan fails to resolve the problem.         Relevant Medications    naproxen (NAPROSYN) 500 MG tablet    Other Relevant Orders    X-Ray Cervical Spine 2 or 3 Views (Completed)    Ambulatory referral/consult to Physical/Occupational Therapy       Psychiatric    Recurrent major depression in partial remission (Chronic)    Current Assessment & Plan     Well Controlled. Stable.          Relevant Medications    citalopram (CELEXA) 20 MG tablet       ENT    Sensorineural hearing loss (SNHL) of both ears (Chronic)    Current Assessment & Plan     Stable.              Cardiac/Vascular    Essential hypertension, benign (Chronic)    Current Assessment & Plan     Well Controlled. Stable.          Relevant Medications    amLODIPine (NORVASC) 10 MG tablet    chlorthalidone (HYGROTEN) 25 MG Tab    irbesartan (AVAPRO) 300 MG tablet    metoprolol succinate (TOPROL-XL) 100 MG 24 hr tablet    Other Relevant Orders    Lipid panel    Comprehensive metabolic panel    Mixed dyslipidemia (Chronic)    Current Assessment & Plan     He appears to be tolerating statin for dyslipidemia without apparent symptoms of myositis or hepatic dysfunction.          Relevant Medications    atorvastatin (LIPITOR) 40 MG tablet    Other Relevant Orders    Lipid panel    Comprehensive metabolic panel       Renal/    BPH associated with nocturia (Chronic)    Current Assessment & Plan     Well Controlled. Stable.          Relevant Medications    doxazosin (CARDURA) 2 MG tablet       Endocrine    Class 1 obesity due to excess calories with serious comorbidity and body mass index (BMI) of 34.0 to 34.9 in adult (Chronic)    Current Assessment & Plan     Wt Readings from Last 6 Encounters:   02/05/20 132.9 kg (292 lb 15.9 oz)   09/12/19 125.4 kg (276 lb 7.3 oz)   06/21/19 123.8 kg (272 lb 14.9 oz)   12/04/18 135.7 kg (299 lb 2.6 oz)   06/07/18 127.5 kg (281 lb)   04/24/18 133.4 kg (294 lb 1.5 oz)     BMI Readings from Last 2 Encounters:   02/05/20 34.74 kg/m²   09/12/19 32.78 kg/m²   Worse. Appears exogenous. Therapeutic lifestyle changes encouraged.            Other Visit Diagnoses     Preventative health care    -  Primary    Screening PSA (prostate specific antigen)        Relevant Orders    PSA, Screening    Screening for HIV without presence of risk factors        Relevant Orders    HIV 1/2 Ag/Ab (4th Gen)           REVIEW OF SYSTEMS  CONSTITUTIONAL: No fever reported.  CARDIOVASCULAR: No angina reported.  PULMONARY: No hemoptysis reported.  PSYCHIATRIC: No mood instability reported.  NEUROLOGIC: No seizures  "reported.  ENDOCRINE: No polydipsia reported.  GASTROINTESTINAL: No blood in stool reported.  GENITOURINARY: No hematuria reported.  ENT: No acute hearing changes reported.  OPHTHALMOLOGIC: No acute vision changes reported.  HEMATOLOGIC: No bleeding problems reported.  MUSCULOSKELETAL: No recent injuries reported.  DERMATOLOGIC: No skin infection reported.  REMAINDER OF COMPLETE REVIEW OF SYSTEMS is negative or noncontributory to present illness except as noted herein.     PHYSICAL EXAM  Vitals:    02/05/20 0832   BP: 122/80   BP Location: Left arm   Patient Position: Sitting   BP Method: Large (Manual)   Pulse: 86   Temp: 96.1 °F (35.6 °C)   SpO2: 97%   Weight: 132.9 kg (292 lb 15.9 oz)   Height: 6' 5" (1.956 m)     CONSTITUTIONAL: Vital signs noted. No apparent distress. Does not appear acutely ill or septic. Appears adequately hydrated.  EYE: Sclerae anicteric. Lids and conjunctiva unremarkable.  ENT: External ENT unremarkable. Oropharynx moist.  NECK: Trachea midline. Thyroid nontender. Cervical spine palpates normally and reasonably symmetric. Moderate left lumbar paraspinal muscle spasm noted. Cervical leftward rotational ROM is mild-moderately limited, and rightward rotational ROM is mildly limited. Cervical ROM is otherwise normal. Shoulder and UE strength and ROM grossly normal.  PULM: Lungs clear. Breathing unlabored.  CV: Auscultation reveals regular rate and rhythm without murmur, gallop or rub. No carotid bruit.  GI: Soft and nontender. Bowel sounds normal.  DERM: Skin warm and moist with normal turgor.  PSYCH: Alert and oriented x 3. Mood is grossly neutral. Affect appropriate. Judgment and insight not grossly compromised.      PAST MEDICAL HISTORY  He has a past medical history of Class 2 obesity due to excess calories with serious comorbidity and body mass index (BMI) of 35.0 to 35.9 in adult, Depression, Diabetes mellitus, Enlarged prostate without lower urinary tract symptoms (luts), " "Hyperlipidemia, Hyperplastic polyp of sigmoid colon, Hypertension, Impaired fasting glucose, Obesity, Scrotal varices, Sensorineural hearing loss (SNHL) of both ears, and Testicular hypofunction.    SURGICAL HISTORY  He has a past surgical history that includes Spinal cord decompression; Colonoscopy; Laminectomy; and Colonoscopy (N/A, 6/7/2018).    FAMILY HISTORY  His family history includes Dementia in his mother; Hypothyroidism in his mother.     ALLERGIES  Review of patient's allergies indicates:  No Known Allergies    SOCIAL HISTORY   TOBACCO USE HISTORY: He reports that he quit smoking about 30 years ago. His smoking use included cigarettes. He has a 10.00 pack-year smoking history. He has never used smokeless tobacco.   ALCOHOL USE HISTORY:  He reports that he drinks alcohol.   RECREATIONAL DRUG USE HISTORY:  He reports that he does not use drugs.    Documentation entered by me for this encounter may have been done in part using speech-recognition technology. Although I have made an effort to ensure accuracy, "sound like" errors may exist and should be interpreted in context. -VEE Patterson MD.   "

## 2020-02-05 NOTE — ASSESSMENT & PLAN NOTE
Onset reported as about four days ago. Quality described as sharp and aching pain. Location of pain is left neck radiating down left arm in the distribution of the C6 nerve root. Associated symptoms include numbness and tingling in the distal C6 distribution. Exacerbating factors include cervical extension and leftward rotation. Severity of symptoms described as moderately severe at worst, mild to moderate at present. He reports no loss of upper extremity strength. He reports no recent relevant injury. We discussed differential diagnosis and risks and benefits of treatment options. He will let me know if current treatment plan fails to resolve the problem.

## 2020-02-05 NOTE — ASSESSMENT & PLAN NOTE
Wt Readings from Last 6 Encounters:   02/05/20 132.9 kg (292 lb 15.9 oz)   09/12/19 125.4 kg (276 lb 7.3 oz)   06/21/19 123.8 kg (272 lb 14.9 oz)   12/04/18 135.7 kg (299 lb 2.6 oz)   06/07/18 127.5 kg (281 lb)   04/24/18 133.4 kg (294 lb 1.5 oz)     BMI Readings from Last 2 Encounters:   02/05/20 34.74 kg/m²   09/12/19 32.78 kg/m²   Worse. Appears exogenous. Therapeutic lifestyle changes encouraged.

## 2020-02-05 NOTE — PROGRESS NOTES
"Kennedy Adame.    Your x-rays showed "degenerative" or "wear-and-tear" type changes. The x-rays did NOT show any fracture or dislocation.    I hope you feel better soon.    Thanks for letting me care for you, thanks for trusting us with your healthcare needs, and thanks for using [facility].    Sincerely,    VEE Patterson MD    P.S. - Please tell me how I'm doing and review me at [email or web address]."

## 2020-02-12 ENCOUNTER — PATIENT OUTREACH (OUTPATIENT)
Dept: OTHER | Facility: OTHER | Age: 65
End: 2020-02-12

## 2020-02-12 NOTE — PROGRESS NOTES
Digital Medicine: Health  Follow-Up    Patient states that he is feeling well. He attributes his higher reading to stress.     He states that he will be moving out of state the first part of April.  He will no longer be able to keep is medical care with [facility], but asked for our assistance with making sure he has medication while he is transitioning his care.  He states that his PCP renewed his prescriptions.    The history is provided by the patient. No  was used.     Follow Up  Follow-up reason(s): reading review          INTERVENTION(S)  encouragement/support, denied resources and denied questions    PLAN  continue monitoring    Will follow up 4 - 6 weeks before patient moves.      There are no preventive care reminders to display for this patient.    Last 5 Patient Entered Readings                                      Current 30 Day Average: 129/72     Recent Readings 2/10/2020 1/26/2020 1/3/2020 12/22/2019 12/10/2019    SBP (mmHg) 145 113 127 139 131    DBP (mmHg) 75 69 82 83 79    Pulse 76 89 83 66 72                      Diet Screening   No change to diet.  He has the following dietary restrictions: low sodium dietHe has meals prepared by family.    Patient lets family grocery shop.  He gets groceries from the grocery store.      Assigning the following patient goals: maintain low sodium diet    Physical Activity Screening   When asked if exercising, patient responded: yes    Patient participates in the following activities: yard/housework    Assigning the following patient goal(s): participate in exercise weekly    Medication Adherence Screening   He did not miss a dose this month.  Patient knows purpose of medications.        SDOH

## 2020-03-02 ENCOUNTER — PATIENT OUTREACH (OUTPATIENT)
Dept: OTHER | Facility: OTHER | Age: 65
End: 2020-03-02

## 2020-03-02 NOTE — PROGRESS NOTES
Digital Medicine: Clinician Follow-Up    Called patient to discuss his at goal readings.    The history is provided by the patient.     Follow Up  Follow-up reason(s): reading review    Patient's blood pressure is close to goal.    He is going to the gym consistently 4-5 times a week for 40 minutes on the elliptical. His clothes are fitting better with this change in exercise but hasn't seen any weight loss.      He denies any changes or problems.       INTERVENTION(S)  reviewed appropriate dose schedule and encouragement/support    PLAN  patient verbalizes understanding and continue monitoring    Patient will maintain his current hypertension medication regimen.     He is moving in the [place] area next week due to a change in jobs within [facility] [employer]. He is aware that once he establishes care within the [place] area that we will be discharging him from our services. I will follow-up in three months to give him time to get this accomplished. He will reach out before if he has a new PCP before three months.      There are no preventive care reminders to display for this patient.    Last 5 Patient Entered Readings                                      Current 30 Day Average: 133/74     Recent Readings 2/24/2020 2/14/2020 2/10/2020 1/26/2020 1/3/2020    SBP (mmHg) 132 121 145 113 127    DBP (mmHg) 79 69 75 69 82    Pulse 80 73 76 89 83             Hypertension Medications             amLODIPine (NORVASC) 10 MG tablet Take 1 tablet (10 mg total) by mouth once daily.    chlorthalidone (HYGROTEN) 25 MG Tab Take 1 tablet (25 mg total) by mouth once daily.    doxazosin (CARDURA) 2 MG tablet Take 1 tablet (2 mg total) by mouth every evening.    irbesartan (AVAPRO) 300 MG tablet Take 1 tablet (300 mg total) by mouth once daily.    metoprolol succinate (TOPROL-XL) 100 MG 24 hr tablet Take 50mg by mouth daily.                 Screenings

## 2020-03-11 ENCOUNTER — PATIENT MESSAGE (OUTPATIENT)
Dept: OTHER | Facility: OTHER | Age: 65
End: 2020-03-11

## 2020-03-12 ENCOUNTER — PATIENT OUTREACH (OUTPATIENT)
Dept: OTHER | Facility: OTHER | Age: 65
End: 2020-03-12

## 2020-03-12 NOTE — PROGRESS NOTES
Digital Medicine: Clinician Follow-Up    Patient messaged me through [facility] to report hypotension symptoms.    The history is provided by the patient.     Follow Up  Follow-up reason(s): reading review and medication change      Readings are trending down Patient reports he has been skipping breakfast which could be related to his lower blood pressure readings.       INTERVENTION(S)  reviewed appropriate dose schedule, recommended med change and encouragement/support    PLAN  patient verbalizes understanding and patient amenable to changes    Patient will hold chlorthalidone due to hypotension readings. Patient will maintain his current dose of amlodipine, doxazosin, irbesartan and metoprolol.    Patient is aware to contact us when he moves to [place]. He is aware that this will cause him to be discharged from the program.       There are no preventive care reminders to display for this patient.    Last 5 Patient Entered Readings                                      Current 30 Day Average: 113/61     Recent Readings 3/11/2020 3/9/2020 3/7/2020 2/24/2020 2/14/2020    SBP (mmHg) 91 103 118 132 121    DBP (mmHg) 50 44 65 79 69    Pulse 84 78 73 80 73             Hypertension Medications             amLODIPine (NORVASC) 10 MG tablet Take 1 tablet (10 mg total) by mouth once daily.    chlorthalidone (HYGROTEN) 25 MG Tab Take 1 tablet (25 mg total) by mouth once daily.    doxazosin (CARDURA) 2 MG tablet Take 1 tablet (2 mg total) by mouth every evening.    irbesartan (AVAPRO) 300 MG tablet Take 1 tablet (300 mg total) by mouth once daily.    metoprolol succinate (TOPROL-XL) 100 MG 24 hr tablet Take 50mg by mouth daily.                 Screenings

## 2020-03-18 ENCOUNTER — PATIENT OUTREACH (OUTPATIENT)
Dept: OTHER | Facility: OTHER | Age: 65
End: 2020-03-18

## 2020-03-20 NOTE — PROGRESS NOTES
Digital Medicine: Health  Follow-Up    Patient called. He states that he is feeling well.  He has moved to [place], but has not established with a PCP yet and is concerned that with the COVID virus, it may take longer.  He requested assistance with transferring his prescriptions to his local pharmacy in [place].  I consulted with his clinician and advised Mr. Cervantes that he can request that transfer directly from the pharmacy.    The history is provided by the patient. No  was used.     Follow Up  Follow-up reason(s): reading review      Readings are trending down due to lifestyle change.        INTERVENTION(S)  encouragement/support and denied resources    PLAN  continue monitoring      There are no preventive care reminders to display for this patient.    Last 5 Patient Entered Readings                                      Current 30 Day Average: 114/59     Recent Readings 3/19/2020 3/11/2020 3/9/2020 3/7/2020 2/24/2020    SBP (mmHg) 124 91 103 118 132    DBP (mmHg) 58 50 44 65 79    Pulse 79 84 78 73 80                      Diet Screening   No change to diet.  Patient reports eating or drinking the following: fruit, water, fresh vegetables and Lean proteins, whole grainsHe has the following dietary restrictions: low sodium diet    Barriers to a Healthy Diet: no barriers to healthy eating    Assigning the following patient goals: maintain low sodium diet    Physical Activity Screening   When asked if exercising, patient responded: yes    Patient participates in the following activities: walking    He identified the following barriers to physical activity: no barriers to being active    Patient says that his new job is more physically active.    Assigning the following patient goal(s): increase physical activity    Medication Adherence Screening   He did not miss a dose this month.  Patient knows purpose of medications.      Patient identified the following reasons for non-compliance:  None      SDOH

## 2020-04-15 DIAGNOSIS — I10 ESSENTIAL HYPERTENSION, BENIGN: Chronic | ICD-10-CM

## 2020-04-15 RX ORDER — METOPROLOL SUCCINATE 100 MG/1
TABLET, EXTENDED RELEASE ORAL
Qty: 90 TABLET | Refills: 0 | Status: SHIPPED | OUTPATIENT
Start: 2020-04-15 | End: 2020-12-02 | Stop reason: SDUPTHER

## 2020-04-21 ENCOUNTER — PATIENT OUTREACH (OUTPATIENT)
Dept: OTHER | Facility: OTHER | Age: 65
End: 2020-04-21

## 2020-05-06 ENCOUNTER — PATIENT OUTREACH (OUTPATIENT)
Dept: OTHER | Facility: OTHER | Age: 65
End: 2020-05-06

## 2020-07-27 ENCOUNTER — TELEPHONE (OUTPATIENT)
Dept: INTERNAL MEDICINE | Facility: CLINIC | Age: 65
End: 2020-07-27

## 2020-08-31 NOTE — PROGRESS NOTES
Received the following [employer] message from patient:    Georges Roman. Yes, I have found a physician in [place]. Would it be possible to have my medical records transferred to his office? Thank you!    I responded to patient via [employer] and advised him to reach out to his PCP to have his records sent to his new provider in [place].  Since patient has now transitioned his care to [place], I have discharged patient from the Hypertension Digital Medicine Program.  I messaged patient's Responsible Provider, and clinician, removed Digital Medicine Care Team, Resolved Hypertension Digital Medicine Program episode, and closed all Digital Medicine open encounters.

## 2020-09-28 ENCOUNTER — PATIENT MESSAGE (OUTPATIENT)
Dept: INTERNAL MEDICINE | Facility: CLINIC | Age: 65
End: 2020-09-28

## 2020-10-22 ENCOUNTER — EXTERNAL RECORD (OUTPATIENT)
Dept: OTHER | Age: 65
End: 2020-10-22

## 2020-11-23 ENCOUNTER — PATIENT OUTREACH (OUTPATIENT)
Dept: ADMINISTRATIVE | Facility: HOSPITAL | Age: 65
End: 2020-11-23

## 2020-12-02 DIAGNOSIS — I10 ESSENTIAL HYPERTENSION, BENIGN: Chronic | ICD-10-CM

## 2020-12-02 RX ORDER — METOPROLOL SUCCINATE 100 MG/1
TABLET, EXTENDED RELEASE ORAL
Qty: 90 TABLET | Refills: 0 | Status: SHIPPED | OUTPATIENT
Start: 2020-12-02

## 2020-12-03 NOTE — TELEPHONE ENCOUNTER
REFILL REQUEST APPROVED.  Requested Prescriptions   Pending Prescriptions Disp Refills    metoprolol succinate (TOPROL-XL) 100 MG 24 hr tablet 90 tablet 0     Sig: Take 50mg by mouth daily.

## 2020-12-21 NOTE — PATIENT INSTRUCTIONS
HOW TO CARE FOR YOUR WOUND  Keep the wound clean and dry and covered with gauze bandage until it has completely healed.  If the wound becomes inflamed or very painful or if you start running a fever > 100.3, return for reevaluation or seek medical care promptly, as these may indicate a serious infection.  You can apply a cool-pack to help with pain.  A very small amount of bleeding can occur. This is normal. If this happens, apply pressure to the area for 10 minutes, and the bleeding should stop.  You can apply a cool-pack to help with pain and/or bleeding.    WOUND CLEANSING AND PACKING INSTRUCTIONS  To change wound packing/dressing, first gently cleanse the outside of the wound with a damp cloth. Do NOT soak the wound or submerse it under water.  Pat dry.  Re-pack the wound fully with strip gauze as directed and then cover with a bandage or gauze dressing. Repeat this daily until the wound has filled in and it no longer is large enough to be packed.   no
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
## 2021-04-21 DIAGNOSIS — E78.2 MIXED DYSLIPIDEMIA: Chronic | ICD-10-CM

## 2021-04-21 DIAGNOSIS — I10 ESSENTIAL HYPERTENSION, BENIGN: Chronic | ICD-10-CM

## 2021-04-21 RX ORDER — ATORVASTATIN CALCIUM 40 MG/1
TABLET, FILM COATED ORAL
Qty: 90 TABLET | Refills: 4 | OUTPATIENT
Start: 2021-04-21

## 2021-04-21 RX ORDER — IRBESARTAN 300 MG/1
TABLET ORAL
Qty: 90 TABLET | Refills: 3 | OUTPATIENT
Start: 2021-04-21

## 2021-04-29 ENCOUNTER — PATIENT MESSAGE (OUTPATIENT)
Dept: RESEARCH | Facility: HOSPITAL | Age: 66
End: 2021-04-29

## 2021-05-07 DIAGNOSIS — N40.1 BPH ASSOCIATED WITH NOCTURIA: Chronic | ICD-10-CM

## 2021-05-07 DIAGNOSIS — R35.1 BPH ASSOCIATED WITH NOCTURIA: Chronic | ICD-10-CM

## 2021-05-07 RX ORDER — DOXAZOSIN 2 MG/1
TABLET ORAL
Qty: 90 TABLET | Refills: 1 | OUTPATIENT
Start: 2021-05-07

## 2021-05-27 DIAGNOSIS — N40.1 BPH ASSOCIATED WITH NOCTURIA: Chronic | ICD-10-CM

## 2021-05-27 DIAGNOSIS — R35.1 BPH ASSOCIATED WITH NOCTURIA: Chronic | ICD-10-CM

## 2021-05-28 RX ORDER — DOXAZOSIN 2 MG/1
TABLET ORAL
Qty: 90 TABLET | Refills: 1 | OUTPATIENT
Start: 2021-05-28

## 2021-06-21 LAB — URATE SERPL-MCNC: 8 MG/DL (ref 3.8–8.4)

## 2021-08-09 ENCOUNTER — EXTERNAL RECORD (OUTPATIENT)
Dept: OTHER | Age: 66
End: 2021-08-09

## 2021-08-10 LAB
ABSOLUTE IMMATURE GRANULOCYTES (OFFPRE24): 0 X10E3/UL (ref 0–0.1)
ALBUMIN SERPL-MCNC: 4.6 G/DL (ref 3.8–4.8)
ALBUMIN/GLOB SERPL: 1.7 {RATIO} (ref 1.2–2.2)
ALP SERPL-CCNC: 69 IU/L (ref 48–121)
ALT SERPL-CCNC: 26 IU/L (ref 0–44)
AST SERPL-CCNC: 20 IU/L (ref 0–40)
BASOPHILS # BLD: 0 X10E3/UL (ref 0–0.2)
BASOPHILS NFR BLD: 1 %
BILIRUB SERPL-MCNC: 0.8 MG/DL (ref 0–1.2)
BUN SERPL-MCNC: 22 MG/DL (ref 8–27)
BUN/CREAT SERPL: 20 (ref 10–24)
CALCIUM SERPL-MCNC: 10.1 MG/DL (ref 8.6–10.2)
CHLORIDE SERPL-SCNC: 99 MMOL/L (ref 96–106)
CHOLEST SERPL-MCNC: 189 MG/DL (ref 100–199)
CO2 SERPL-SCNC: 23 MMOL/L (ref 20–29)
CREAT SERPL-MCNC: 1.12 MG/DL (ref 0.76–1.27)
EOSINOPHIL # BLD: 0 X10E3/UL (ref 0–0.4)
EOSINOPHIL NFR BLD: 1 %
ERYTHROCYTE [DISTWIDTH] IN BLOOD: 12.6 % (ref 11.6–15.4)
GLOBULIN SER-MCNC: 2.7 G/DL (ref 1.5–4.5)
GLUCOSE SERPL-MCNC: 93 MG/DL (ref 65–99)
HBA1C MFR BLD: 5.8 % (ref 4.8–5.6)
HCT VFR BLD CALC: 47.8 % (ref 37.5–51)
HDLC SERPL-MCNC: 48 MG/DL
HGB BLD-MCNC: 16.3 G/DL (ref 13–17.7)
IMMATURE GRANULOCYTES (OFFPRE25): 0 %
LDLC SERPL CALC-MCNC: 123 MG/DL (ref 0–99)
LENGTH OF FAST TIME PATIENT: YES H
LENGTH OF FAST TIME PATIENT: YES H
LYMPHOCYTES # BLD: 1.9 X10E3/UL (ref 0.7–3.1)
LYMPHOCYTES NFR BLD: 46 %
MCH RBC QN AUTO: 31.5 PG (ref 26.6–33)
MCHC RBC AUTO-ENTMCNC: 34.1 G/DL (ref 31.5–35.7)
MCV RBC AUTO: 93 FL (ref 79–97)
MONOCYTES # BLD: 0.6 X10E3/UL (ref 0.1–0.9)
MONOCYTES NFR BLD: 15 %
NEUTROPHILS # BLD: 1.5 X10E3/UL (ref 1.4–7)
NEUTROPHILS NFR BLD: 37 %
PLATELET # BLD: 262 X10E3/UL (ref 150–450)
POTASSIUM SERPL-SCNC: 3.9 MMOL/L (ref 3.5–5.2)
PROT SERPL-MCNC: 7.3 G/DL (ref 6–8.5)
PSA SERPL-MCNC: 1 NG/ML (ref 0–4)
RBC # BLD: 5.17 X10E6/UL (ref 4.14–5.8)
SODIUM SERPL-SCNC: 138 MMOL/L (ref 134–144)
TESTOST SERPL-MCNC: 406 NG/DL (ref 264–916)
TRIGL SERPL-MCNC: 98 MG/DL (ref 0–149)
TSH SERPL-ACNC: 2 UIU/ML (ref 0.45–4.5)
URATE SERPL-MCNC: 8.6 MG/DL (ref 3.8–8.4)
VLDLC SERPL CALC-MCNC: 18 MG/DL (ref 5–40)
WBC # BLD: 4.1 X10E3/UL (ref 3.4–10.8)

## 2022-01-01 ENCOUNTER — EXTERNAL RECORD (OUTPATIENT)
Dept: OTHER | Age: 67
End: 2022-01-01

## 2022-02-24 RX ORDER — METOPROLOL SUCCINATE 100 MG/1
TABLET, EXTENDED RELEASE ORAL
COMMUNITY
End: 2022-03-01 | Stop reason: SDUPTHER

## 2022-02-24 RX ORDER — IRBESARTAN 300 MG/1
TABLET ORAL
COMMUNITY
End: 2022-03-01 | Stop reason: SDUPTHER

## 2022-02-24 RX ORDER — ATORVASTATIN CALCIUM 40 MG/1
TABLET, FILM COATED ORAL
COMMUNITY
End: 2022-03-01 | Stop reason: SDUPTHER

## 2022-02-24 RX ORDER — AMLODIPINE BESYLATE 10 MG/1
TABLET ORAL
COMMUNITY
End: 2022-03-01 | Stop reason: SDUPTHER

## 2022-02-24 RX ORDER — CITALOPRAM 20 MG/1
TABLET ORAL
COMMUNITY
End: 2022-03-01 | Stop reason: SDUPTHER

## 2022-02-24 RX ORDER — ALLOPURINOL 100 MG/1
TABLET ORAL
COMMUNITY
End: 2022-03-01 | Stop reason: SDUPTHER

## 2022-02-24 RX ORDER — DOXAZOSIN 2 MG/1
TABLET ORAL
COMMUNITY
End: 2022-03-01

## 2022-02-24 RX ORDER — CHLORTHALIDONE 25 MG/1
TABLET ORAL
COMMUNITY
End: 2022-03-01 | Stop reason: SDUPTHER

## 2022-03-01 ENCOUNTER — OFFICE VISIT (OUTPATIENT)
Dept: FAMILY MEDICINE | Age: 67
End: 2022-03-01

## 2022-03-01 VITALS
BODY MASS INDEX: 32.3 KG/M2 | SYSTOLIC BLOOD PRESSURE: 132 MMHG | HEIGHT: 77 IN | DIASTOLIC BLOOD PRESSURE: 84 MMHG | HEART RATE: 65 BPM | WEIGHT: 273.59 LBS | OXYGEN SATURATION: 95 %

## 2022-03-01 DIAGNOSIS — Z13.0 SCREENING FOR DEFICIENCY ANEMIA: ICD-10-CM

## 2022-03-01 DIAGNOSIS — I10 PRIMARY HYPERTENSION: ICD-10-CM

## 2022-03-01 DIAGNOSIS — Z23 NEED FOR VACCINATION: Primary | ICD-10-CM

## 2022-03-01 DIAGNOSIS — F41.1 GENERALIZED ANXIETY DISORDER: ICD-10-CM

## 2022-03-01 DIAGNOSIS — Z13.1 SCREENING FOR DIABETES MELLITUS (DM): ICD-10-CM

## 2022-03-01 DIAGNOSIS — E78.00 HYPERCHOLESTEREMIA: ICD-10-CM

## 2022-03-01 DIAGNOSIS — Z12.5 PROSTATE CANCER SCREENING: ICD-10-CM

## 2022-03-01 PROCEDURE — G0009 ADMIN PNEUMOCOCCAL VACCINE: HCPCS | Performed by: NURSE PRACTITIONER

## 2022-03-01 PROCEDURE — 90662 IIV NO PRSV INCREASED AG IM: CPT | Performed by: NURSE PRACTITIONER

## 2022-03-01 PROCEDURE — 90732 PPSV23 VACC 2 YRS+ SUBQ/IM: CPT | Performed by: NURSE PRACTITIONER

## 2022-03-01 PROCEDURE — G0008 ADMIN INFLUENZA VIRUS VAC: HCPCS | Performed by: NURSE PRACTITIONER

## 2022-03-01 PROCEDURE — 99213 OFFICE O/P EST LOW 20 MIN: CPT | Performed by: NURSE PRACTITIONER

## 2022-03-01 RX ORDER — AMLODIPINE BESYLATE 10 MG/1
10 TABLET ORAL DAILY
Qty: 90 TABLET | Refills: 1 | Status: SHIPPED | OUTPATIENT
Start: 2022-03-01 | End: 2022-06-16 | Stop reason: SDUPTHER

## 2022-03-01 RX ORDER — ALLOPURINOL 100 MG/1
100 TABLET ORAL DAILY
Qty: 90 TABLET | Refills: 1 | Status: SHIPPED | OUTPATIENT
Start: 2022-03-01 | End: 2022-06-16 | Stop reason: SDUPTHER

## 2022-03-01 RX ORDER — DOXAZOSIN 2 MG/1
2 TABLET ORAL NIGHTLY
Qty: 90 TABLET | Refills: 1 | Status: SHIPPED | OUTPATIENT
Start: 2022-03-01 | End: 2022-06-16 | Stop reason: SDUPTHER

## 2022-03-01 RX ORDER — ATORVASTATIN CALCIUM 40 MG/1
40 TABLET, FILM COATED ORAL DAILY
Qty: 90 TABLET | Refills: 1 | Status: SHIPPED | OUTPATIENT
Start: 2022-03-01 | End: 2022-06-16 | Stop reason: SDUPTHER

## 2022-03-01 RX ORDER — CITALOPRAM 20 MG/1
20 TABLET ORAL DAILY
Qty: 90 TABLET | Refills: 1 | Status: SHIPPED | OUTPATIENT
Start: 2022-03-01 | End: 2022-06-16 | Stop reason: SDUPTHER

## 2022-03-01 RX ORDER — IRBESARTAN 300 MG/1
300 TABLET ORAL DAILY
Qty: 90 TABLET | Refills: 1 | Status: SHIPPED | OUTPATIENT
Start: 2022-03-01 | End: 2022-06-16 | Stop reason: SDUPTHER

## 2022-03-01 RX ORDER — CHLORTHALIDONE 25 MG/1
25 TABLET ORAL DAILY
Qty: 90 TABLET | Refills: 1 | Status: SHIPPED | OUTPATIENT
Start: 2022-03-01 | End: 2022-06-16 | Stop reason: SDUPTHER

## 2022-03-01 RX ORDER — METOPROLOL SUCCINATE 100 MG/1
50 TABLET, EXTENDED RELEASE ORAL DAILY
Qty: 45 TABLET | Refills: 1 | Status: SHIPPED | OUTPATIENT
Start: 2022-03-01 | End: 2022-06-16 | Stop reason: SDUPTHER

## 2022-03-01 ASSESSMENT — PATIENT HEALTH QUESTIONNAIRE - PHQ9
1. LITTLE INTEREST OR PLEASURE IN DOING THINGS: NOT AT ALL
8. MOVING OR SPEAKING SO SLOWLY THAT OTHER PEOPLE COULD HAVE NOTICED. OR THE OPPOSITE, BEING SO FIGETY OR RESTLESS THAT YOU HAVE BEEN MOVING AROUND A LOT MORE THAN USUAL: NOT AT ALL
4. FEELING TIRED OR HAVING LITTLE ENERGY: NOT AT ALL
SUM OF ALL RESPONSES TO PHQ9 QUESTIONS 1 AND 2: 0
5. POOR APPETITE, WEIGHT LOSS, OR OVEREATING: NOT AT ALL
7. TROUBLE CONCENTRATING ON THINGS, SUCH AS READING THE NEWSPAPER OR WATCHING TELEVISION: NOT AT ALL
6. FEELING BAD ABOUT YOURSELF - OR THAT YOU ARE A FAILURE OR HAVE LET YOURSELF OR YOUR FAMILY DOWN: NOT AT ALL
9. THOUGHTS THAT YOU WOULD BE BETTER OFF DEAD, OR OF HURTING YOURSELF: NOT AT ALL
SUM OF ALL RESPONSES TO PHQ9 QUESTIONS 1 AND 2: 0
SUM OF ALL RESPONSES TO PHQ QUESTIONS 1-9: 0
CLINICAL INTERPRETATION OF PHQ2 SCORE: NO FURTHER SCREENING NEEDED
3. TROUBLE FALLING OR STAYING ASLEEP OR SLEEPING TOO MUCH: NOT AT ALL
10. IF YOU CHECKED OFF ANY PROBLEMS, HOW DIFFICULT HAVE THESE PROBLEMS MADE IT FOR YOU TO DO YOUR WORK, TAKE CARE OF THINGS AT HOME, OR GET ALONG WITH OTHER PEOPLE: NOT DIFFICULT AT ALL
2. FEELING DOWN, DEPRESSED OR HOPELESS: NOT AT ALL

## 2022-05-31 NOTE — PROGRESS NOTES
4/21- pt moved to [place]   Chief Complaint   Patient presents with    Leg Pain     EMS states pt c/o bilat upper leg pain for several months, pt also states lower back pain today, staff at work stated pt doesn't seem normal, dizziness, given 4mg zofran

## 2022-06-08 ENCOUNTER — LAB SERVICES (OUTPATIENT)
Dept: LAB | Age: 67
End: 2022-06-08

## 2022-06-08 DIAGNOSIS — E78.00 HYPERCHOLESTEREMIA: ICD-10-CM

## 2022-06-08 DIAGNOSIS — Z13.0 SCREENING FOR DEFICIENCY ANEMIA: ICD-10-CM

## 2022-06-08 DIAGNOSIS — Z12.5 PROSTATE CANCER SCREENING: ICD-10-CM

## 2022-06-08 DIAGNOSIS — Z13.1 SCREENING FOR DIABETES MELLITUS (DM): ICD-10-CM

## 2022-06-08 LAB
ALBUMIN SERPL-MCNC: 4 G/DL (ref 3.6–5.1)
ALBUMIN/GLOB SERPL: 1.3 {RATIO} (ref 1–2.4)
ALP SERPL-CCNC: 72 UNITS/L (ref 45–117)
ALT SERPL-CCNC: 46 UNITS/L
ANION GAP SERPL CALC-SCNC: 9 MMOL/L (ref 7–19)
AST SERPL-CCNC: 29 UNITS/L
BASOPHILS # BLD: 0 K/MCL (ref 0–0.3)
BASOPHILS NFR BLD: 0 %
BILIRUB SERPL-MCNC: 1 MG/DL (ref 0.2–1)
BUN SERPL-MCNC: 17 MG/DL (ref 6–20)
BUN/CREAT SERPL: 17 (ref 7–25)
CALCIUM SERPL-MCNC: 9.1 MG/DL (ref 8.4–10.2)
CHLORIDE SERPL-SCNC: 101 MMOL/L (ref 97–110)
CHOLEST SERPL-MCNC: 170 MG/DL
CHOLEST/HDLC SERPL: 2.2 {RATIO}
CO2 SERPL-SCNC: 31 MMOL/L (ref 21–32)
CREAT SERPL-MCNC: 1 MG/DL (ref 0.67–1.17)
DEPRECATED RDW RBC: 45.7 FL (ref 39–50)
EOSINOPHIL # BLD: 0 K/MCL (ref 0–0.5)
EOSINOPHIL NFR BLD: 1 %
ERYTHROCYTE [DISTWIDTH] IN BLOOD: 13 % (ref 11–15)
FASTING DURATION TIME PATIENT: 12 HOURS (ref 0–999)
FASTING DURATION TIME PATIENT: 12 HOURS (ref 0–999)
GFR SERPLBLD BASED ON 1.73 SQ M-ARVRAT: 84 ML/MIN
GLOBULIN SER-MCNC: 3.1 G/DL (ref 2–4)
GLUCOSE SERPL-MCNC: 108 MG/DL (ref 70–99)
HCT VFR BLD CALC: 40.6 % (ref 39–51)
HDLC SERPL-MCNC: 76 MG/DL
HGB BLD-MCNC: 14 G/DL (ref 13–17)
IMM GRANULOCYTES # BLD AUTO: 0 K/MCL (ref 0–0.2)
IMM GRANULOCYTES # BLD: 0 %
LDLC SERPL CALC-MCNC: 81 MG/DL
LYMPHOCYTES # BLD: 1.8 K/MCL (ref 1–4)
LYMPHOCYTES NFR BLD: 38 %
MCH RBC QN AUTO: 32.3 PG (ref 26–34)
MCHC RBC AUTO-ENTMCNC: 34.5 G/DL (ref 32–36.5)
MCV RBC AUTO: 93.8 FL (ref 78–100)
MONOCYTES # BLD: 0.7 K/MCL (ref 0.3–0.9)
MONOCYTES NFR BLD: 14 %
NEUTROPHILS # BLD: 2.2 K/MCL (ref 1.8–7.7)
NEUTROPHILS NFR BLD: 47 %
NONHDLC SERPL-MCNC: 94 MG/DL
PLATELET # BLD AUTO: 238 K/MCL (ref 140–450)
POTASSIUM SERPL-SCNC: 4.3 MMOL/L (ref 3.4–5.1)
PROT SERPL-MCNC: 7.1 G/DL (ref 6.4–8.2)
RBC # BLD: 4.33 MIL/MCL (ref 4.5–5.9)
SODIUM SERPL-SCNC: 137 MMOL/L (ref 135–145)
TRIGL SERPL-MCNC: 65 MG/DL
WBC # BLD: 4.8 K/MCL (ref 4.2–11)

## 2022-06-08 PROCEDURE — 36415 COLL VENOUS BLD VENIPUNCTURE: CPT | Performed by: INTERNAL MEDICINE

## 2022-06-08 PROCEDURE — 80053 COMPREHEN METABOLIC PANEL: CPT | Performed by: INTERNAL MEDICINE

## 2022-06-08 PROCEDURE — G0103 PSA SCREENING: HCPCS | Performed by: CLINICAL MEDICAL LABORATORY

## 2022-06-08 PROCEDURE — 80061 LIPID PANEL: CPT | Performed by: CLINICAL MEDICAL LABORATORY

## 2022-06-08 PROCEDURE — 85025 COMPLETE CBC W/AUTO DIFF WBC: CPT | Performed by: INTERNAL MEDICINE

## 2022-06-09 LAB — PSA SERPL-MCNC: 0.72 NG/ML

## 2022-06-14 ASSESSMENT — PATIENT HEALTH QUESTIONNAIRE - PHQ9
SUM OF ALL RESPONSES TO PHQ9 QUESTIONS 1 AND 2: 0
2. FEELING DOWN, DEPRESSED OR HOPELESS: NOT AT ALL
CLINICAL INTERPRETATION OF PHQ2 SCORE: NO FURTHER SCREENING NEEDED
CLINICAL INTERPRETATION OF PHQ2 SCORE: 0
1. LITTLE INTEREST OR PLEASURE IN DOING THINGS: NOT AT ALL

## 2022-06-16 ENCOUNTER — OFFICE VISIT (OUTPATIENT)
Dept: FAMILY MEDICINE | Age: 67
End: 2022-06-16

## 2022-06-16 VITALS
HEART RATE: 68 BPM | DIASTOLIC BLOOD PRESSURE: 76 MMHG | HEIGHT: 77 IN | SYSTOLIC BLOOD PRESSURE: 110 MMHG | OXYGEN SATURATION: 94 % | BODY MASS INDEX: 32.67 KG/M2 | WEIGHT: 276.68 LBS

## 2022-06-16 DIAGNOSIS — Z13.0 SCREENING FOR DEFICIENCY ANEMIA: ICD-10-CM

## 2022-06-16 DIAGNOSIS — Z00.00 MEDICARE ANNUAL WELLNESS VISIT, INITIAL: ICD-10-CM

## 2022-06-16 DIAGNOSIS — F33.40 MAJOR DEPRESSIVE DISORDER, RECURRENT, IN REMISSION (CMD): ICD-10-CM

## 2022-06-16 DIAGNOSIS — Z11.59 NEED FOR HEPATITIS C SCREENING TEST: ICD-10-CM

## 2022-06-16 DIAGNOSIS — R35.1 BPH ASSOCIATED WITH NOCTURIA: ICD-10-CM

## 2022-06-16 DIAGNOSIS — N40.1 BPH ASSOCIATED WITH NOCTURIA: ICD-10-CM

## 2022-06-16 DIAGNOSIS — R73.01 ELEVATED FASTING GLUCOSE: ICD-10-CM

## 2022-06-16 DIAGNOSIS — M10.9 ACUTE GOUT OF RIGHT FOOT, UNSPECIFIED CAUSE: ICD-10-CM

## 2022-06-16 DIAGNOSIS — E78.2 MIXED DYSLIPIDEMIA: ICD-10-CM

## 2022-06-16 DIAGNOSIS — Z13.6 SCREENING FOR CARDIOVASCULAR CONDITION: ICD-10-CM

## 2022-06-16 DIAGNOSIS — I10 ESSENTIAL HYPERTENSION: Primary | ICD-10-CM

## 2022-06-16 PROCEDURE — 99213 OFFICE O/P EST LOW 20 MIN: CPT | Performed by: NURSE PRACTITIONER

## 2022-06-16 PROCEDURE — G0402 INITIAL PREVENTIVE EXAM: HCPCS | Performed by: NURSE PRACTITIONER

## 2022-06-16 RX ORDER — AMLODIPINE BESYLATE 10 MG/1
10 TABLET ORAL DAILY
Qty: 90 TABLET | Refills: 3 | Status: SHIPPED | OUTPATIENT
Start: 2022-06-16 | End: 2023-07-03 | Stop reason: SDUPTHER

## 2022-06-16 RX ORDER — IRBESARTAN 300 MG/1
300 TABLET ORAL DAILY
Qty: 90 TABLET | Refills: 3 | Status: SHIPPED | OUTPATIENT
Start: 2022-06-16 | End: 2023-07-03 | Stop reason: SDUPTHER

## 2022-06-16 RX ORDER — DOXAZOSIN 2 MG/1
2 TABLET ORAL NIGHTLY
Qty: 90 TABLET | Refills: 3 | Status: SHIPPED | OUTPATIENT
Start: 2022-06-16 | End: 2023-07-03 | Stop reason: SDUPTHER

## 2022-06-16 RX ORDER — METOPROLOL SUCCINATE 100 MG/1
50 TABLET, EXTENDED RELEASE ORAL DAILY
Qty: 45 TABLET | Refills: 3 | Status: SHIPPED | OUTPATIENT
Start: 2022-06-16 | End: 2023-07-03 | Stop reason: SDUPTHER

## 2022-06-16 RX ORDER — CHLORTHALIDONE 25 MG/1
25 TABLET ORAL DAILY
Qty: 90 TABLET | Refills: 3 | Status: SHIPPED | OUTPATIENT
Start: 2022-06-16 | End: 2023-07-03 | Stop reason: SDUPTHER

## 2022-06-16 RX ORDER — ATORVASTATIN CALCIUM 40 MG/1
40 TABLET, FILM COATED ORAL DAILY
Qty: 90 TABLET | Refills: 3 | Status: SHIPPED | OUTPATIENT
Start: 2022-06-16 | End: 2023-07-03 | Stop reason: SDUPTHER

## 2022-06-16 RX ORDER — ALLOPURINOL 100 MG/1
100 TABLET ORAL DAILY
Qty: 90 TABLET | Refills: 3 | Status: SHIPPED | OUTPATIENT
Start: 2022-06-16 | End: 2022-06-16 | Stop reason: SDUPTHER

## 2022-06-16 RX ORDER — ALLOPURINOL 100 MG/1
100 TABLET ORAL DAILY
Qty: 90 TABLET | Refills: 3 | Status: SHIPPED | OUTPATIENT
Start: 2022-06-16 | End: 2023-07-03 | Stop reason: SDUPTHER

## 2022-06-16 RX ORDER — CITALOPRAM 20 MG/1
20 TABLET ORAL DAILY
Qty: 90 TABLET | Refills: 3 | Status: SHIPPED | OUTPATIENT
Start: 2022-06-16 | End: 2023-07-03 | Stop reason: SDUPTHER

## 2022-06-16 ASSESSMENT — PATIENT HEALTH QUESTIONNAIRE - PHQ9
CLINICAL INTERPRETATION OF PHQ2 SCORE: NO FURTHER SCREENING NEEDED
SUM OF ALL RESPONSES TO PHQ9 QUESTIONS 1 AND 2: 0
1. LITTLE INTEREST OR PLEASURE IN DOING THINGS: NOT AT ALL
2. FEELING DOWN, DEPRESSED OR HOPELESS: NOT AT ALL
SUM OF ALL RESPONSES TO PHQ9 QUESTIONS 1 AND 2: 0

## 2022-10-05 ENCOUNTER — EXTERNAL RECORD (OUTPATIENT)
Dept: OTHER | Age: 67
End: 2022-10-05

## 2023-03-07 RX ORDER — ATORVASTATIN CALCIUM 40 MG/1
40 TABLET, FILM COATED ORAL DAILY
Qty: 90 TABLET | Refills: 3 | Status: CANCELLED | OUTPATIENT
Start: 2023-03-07

## 2023-03-07 RX ORDER — METOPROLOL SUCCINATE 100 MG/1
50 TABLET, EXTENDED RELEASE ORAL DAILY
Qty: 45 TABLET | Refills: 3 | Status: CANCELLED | OUTPATIENT
Start: 2023-03-07

## 2023-03-07 RX ORDER — DOXAZOSIN 2 MG/1
2 TABLET ORAL NIGHTLY
Qty: 90 TABLET | Refills: 3 | Status: CANCELLED | OUTPATIENT
Start: 2023-03-07

## 2023-03-07 RX ORDER — CITALOPRAM 20 MG/1
20 TABLET ORAL DAILY
Qty: 90 TABLET | Refills: 3 | Status: CANCELLED | OUTPATIENT
Start: 2023-03-07

## 2023-03-07 RX ORDER — IRBESARTAN 300 MG/1
300 TABLET ORAL DAILY
Qty: 90 TABLET | Refills: 3 | Status: CANCELLED | OUTPATIENT
Start: 2023-03-07

## 2023-03-07 RX ORDER — ALLOPURINOL 100 MG/1
100 TABLET ORAL DAILY
Qty: 90 TABLET | Refills: 3 | Status: CANCELLED | OUTPATIENT
Start: 2023-03-07

## 2023-03-07 RX ORDER — AMLODIPINE BESYLATE 10 MG/1
10 TABLET ORAL DAILY
Qty: 90 TABLET | Refills: 3 | Status: CANCELLED | OUTPATIENT
Start: 2023-03-07

## 2023-03-07 RX ORDER — CHLORTHALIDONE 25 MG/1
25 TABLET ORAL DAILY
Qty: 90 TABLET | Refills: 3 | Status: CANCELLED | OUTPATIENT
Start: 2023-03-07

## 2023-03-13 ENCOUNTER — OFFICE VISIT (OUTPATIENT)
Dept: OPHTHALMOLOGY | Age: 68
End: 2023-03-13

## 2023-03-13 ENCOUNTER — APPOINTMENT (OUTPATIENT)
Dept: OPHTHALMOLOGY | Age: 68
End: 2023-03-13

## 2023-03-13 DIAGNOSIS — H25.813 COMBINED FORMS OF AGE-RELATED CATARACT OF BOTH EYES: ICD-10-CM

## 2023-03-13 DIAGNOSIS — H52.4 HYPEROPIA OF BOTH EYES WITH ASTIGMATISM AND PRESBYOPIA: Primary | ICD-10-CM

## 2023-03-13 DIAGNOSIS — H52.03 HYPEROPIA OF BOTH EYES WITH ASTIGMATISM AND PRESBYOPIA: Primary | ICD-10-CM

## 2023-03-13 DIAGNOSIS — H52.203 HYPEROPIA OF BOTH EYES WITH ASTIGMATISM AND PRESBYOPIA: Primary | ICD-10-CM

## 2023-03-13 DIAGNOSIS — Q14.1 CONGENITAL HYPERTROPHY OF RETINAL PIGMENT EPITHELIUM OF LEFT EYE: ICD-10-CM

## 2023-03-13 PROCEDURE — 92004 COMPRE OPH EXAM NEW PT 1/>: CPT | Performed by: OPTOMETRIST

## 2023-03-13 PROCEDURE — 92015 DETERMINE REFRACTIVE STATE: CPT | Performed by: OPTOMETRIST

## 2023-03-13 ASSESSMENT — REFRACTION_MANIFEST
METHOD_AUTOREFRACTION: 1
OS_AXIS: 080
OS_SPHERE: +1.75
OD_SPHERE: +1.75
OD_CYLINDER: -1.00
OS_CYLINDER: -0.75
OD_AXIS: 100

## 2023-03-13 ASSESSMENT — REFRACTION_WEARINGRX
OS_AXIS: 111
OS_SPHERE: +1.50
OD_ADD: +2.25
OD_AXIS: 075
OD_CYLINDER: -0.75
OD_SPHERE: +1.75
OS_CYLINDER: -1.00
SPECS_TYPE: PROGRESSIVE
OS_ADD: +2.25

## 2023-03-13 ASSESSMENT — CONF VISUAL FIELD
OD_SUPERIOR_NASAL_RESTRICTION: 0
OS_INFERIOR_TEMPORAL_RESTRICTION: 0
OS_SUPERIOR_TEMPORAL_RESTRICTION: 0
OD_INFERIOR_TEMPORAL_RESTRICTION: 0
METHOD: COUNTING FINGERS
OS_SUPERIOR_NASAL_RESTRICTION: 0
OD_INFERIOR_NASAL_RESTRICTION: 0
OD_SUPERIOR_TEMPORAL_RESTRICTION: 0
OS_NORMAL: 1
OD_NORMAL: 1
OS_INFERIOR_NASAL_RESTRICTION: 0

## 2023-03-13 ASSESSMENT — SLIT LAMP EXAM - LIDS
COMMENTS: DERMATOCHALASIS - UPPER LID
COMMENTS: DERMATOCHALASIS - UPPER LID

## 2023-03-13 ASSESSMENT — KERATOMETRY
OS_K1POWER_DIOPTERS: 43.50
OS_AXISANGLE2_DEGREES: 180
OS_AXISANGLE_DEGREES: 090
OD_AXISANGLE2_DEGREES: 105
OD_K2POWER_DIOPTERS: 43.25
OD_AXISANGLE_DEGREES: 015
OD_K1POWER_DIOPTERS: 42.50
OS_K2POWER_DIOPTERS: 43.5
METHOD_AUTO_MANUAL: AUTOMATED

## 2023-03-13 ASSESSMENT — REFRACTION
OS_ADD: +2.50
OS_SPHERE: +1.50
OD_AXIS: 103
OS_AXIS: 088
OD_SPHERE: +1.75
OS_CYLINDER: -0.75
OD_ADD: +2.50
OD_CYLINDER: -1.00

## 2023-03-13 ASSESSMENT — VISUAL ACUITY
OD_SC: 20/400
OS_SC: 20/400
OD_CC: 20/40
OS_CC: 20/25
OD_CC: 20/20
CORRECTION_TYPE: GLASSES
OS_CC: 20/30

## 2023-03-13 ASSESSMENT — EXTERNAL EXAM - LEFT EYE: OS_EXAM: NORMAL

## 2023-03-13 ASSESSMENT — EXTERNAL EXAM - RIGHT EYE: OD_EXAM: NORMAL

## 2023-03-13 ASSESSMENT — TONOMETRY
OD_IOP_MMHG: 18
IOP_METHOD: PERKINS TONOMETER
OS_IOP_MMHG: 18

## 2023-03-14 ENCOUNTER — OFFICE VISIT (OUTPATIENT)
Dept: FAMILY MEDICINE | Age: 68
End: 2023-03-14

## 2023-03-14 VITALS
SYSTOLIC BLOOD PRESSURE: 130 MMHG | BODY MASS INDEX: 33.53 KG/M2 | WEIGHT: 283.95 LBS | HEART RATE: 67 BPM | OXYGEN SATURATION: 97 % | DIASTOLIC BLOOD PRESSURE: 84 MMHG | HEIGHT: 77 IN

## 2023-03-14 DIAGNOSIS — Z87.891 FORMER SMOKER: ICD-10-CM

## 2023-03-14 DIAGNOSIS — H61.23 BILATERAL IMPACTED CERUMEN: ICD-10-CM

## 2023-03-14 DIAGNOSIS — Z12.11 COLON CANCER SCREENING: Primary | ICD-10-CM

## 2023-03-14 PROCEDURE — 69209 REMOVE IMPACTED EAR WAX UNI: CPT | Performed by: NURSE PRACTITIONER

## 2023-03-21 ENCOUNTER — TELEPHONE (OUTPATIENT)
Dept: INTERNAL MEDICINE | Age: 68
End: 2023-03-21

## 2023-03-21 ENCOUNTER — HOSPITAL ENCOUNTER (OUTPATIENT)
Dept: ULTRASOUND IMAGING | Age: 68
Discharge: HOME | End: 2023-03-21
Attending: NURSE PRACTITIONER

## 2023-03-21 DIAGNOSIS — Z87.891 FORMER SMOKER: ICD-10-CM

## 2023-03-21 PROCEDURE — 76706 US ABDL AORTA SCREEN AAA: CPT

## 2023-03-21 PROCEDURE — 76706 US ABDL AORTA SCREEN AAA: CPT | Performed by: RADIOLOGY

## 2023-03-22 ENCOUNTER — NURSE ONLY (OUTPATIENT)
Dept: OPHTHALMOLOGY | Age: 68
End: 2023-03-22

## 2023-03-22 ENCOUNTER — APPOINTMENT (OUTPATIENT)
Dept: OPHTHALMOLOGY | Age: 68
End: 2023-03-22

## 2023-03-22 DIAGNOSIS — H52.4 HYPEROPIA OF BOTH EYES WITH ASTIGMATISM AND PRESBYOPIA: Primary | ICD-10-CM

## 2023-03-22 DIAGNOSIS — H52.03 HYPEROPIA OF BOTH EYES WITH ASTIGMATISM AND PRESBYOPIA: Primary | ICD-10-CM

## 2023-03-22 DIAGNOSIS — H52.203 HYPEROPIA OF BOTH EYES WITH ASTIGMATISM AND PRESBYOPIA: Primary | ICD-10-CM

## 2023-05-04 ENCOUNTER — TELEPHONE (OUTPATIENT)
Dept: GASTROENTEROLOGY | Age: 68
End: 2023-05-04

## 2023-05-04 ENCOUNTER — PREP FOR CASE (OUTPATIENT)
Dept: GASTROENTEROLOGY | Age: 68
End: 2023-05-04

## 2023-05-04 DIAGNOSIS — Z86.010 HISTORY OF COLON POLYPS: ICD-10-CM

## 2023-05-04 DIAGNOSIS — Z12.11 COLON CANCER SCREENING: Primary | ICD-10-CM

## 2023-05-04 RX ORDER — SODIUM CHLORIDE 9 MG/ML
INJECTION, SOLUTION INTRAVENOUS CONTINUOUS
Status: CANCELLED | OUTPATIENT
Start: 2023-05-04

## 2023-05-04 RX ORDER — BISACODYL 5 MG/1
10 TABLET, DELAYED RELEASE ORAL ONCE
Qty: 2 TABLET | Refills: 0 | Status: SHIPPED | OUTPATIENT
Start: 2023-05-04 | End: 2023-08-19

## 2023-05-04 RX ORDER — 0.9 % SODIUM CHLORIDE 0.9 %
2 VIAL (ML) INJECTION EVERY 12 HOURS SCHEDULED
Status: CANCELLED | OUTPATIENT
Start: 2023-05-04

## 2023-06-30 RX ORDER — CHLORTHALIDONE 25 MG/1
TABLET ORAL
Qty: 90 TABLET | Refills: 3 | OUTPATIENT
Start: 2023-06-30

## 2023-06-30 RX ORDER — ALLOPURINOL 100 MG/1
TABLET ORAL
Qty: 90 TABLET | Refills: 3 | OUTPATIENT
Start: 2023-06-30

## 2023-06-30 RX ORDER — METOPROLOL SUCCINATE 100 MG/1
TABLET, EXTENDED RELEASE ORAL
Qty: 45 TABLET | Refills: 3 | OUTPATIENT
Start: 2023-06-30

## 2023-06-30 RX ORDER — ATORVASTATIN CALCIUM 40 MG/1
TABLET, FILM COATED ORAL
Qty: 90 TABLET | Refills: 3 | OUTPATIENT
Start: 2023-06-30

## 2023-06-30 RX ORDER — AMLODIPINE BESYLATE 10 MG/1
TABLET ORAL
Qty: 90 TABLET | Refills: 3 | OUTPATIENT
Start: 2023-06-30

## 2023-06-30 RX ORDER — IRBESARTAN 300 MG/1
TABLET ORAL
Qty: 90 TABLET | Refills: 3 | OUTPATIENT
Start: 2023-06-30

## 2023-06-30 RX ORDER — DOXAZOSIN 2 MG/1
TABLET ORAL
Qty: 90 TABLET | Refills: 3 | OUTPATIENT
Start: 2023-06-30

## 2023-06-30 RX ORDER — CITALOPRAM 20 MG/1
TABLET ORAL
Qty: 90 TABLET | Refills: 3 | OUTPATIENT
Start: 2023-06-30

## 2023-07-10 ENCOUNTER — LAB SERVICES (OUTPATIENT)
Dept: LAB | Age: 68
End: 2023-07-10

## 2023-07-10 DIAGNOSIS — N40.1 BPH ASSOCIATED WITH NOCTURIA: ICD-10-CM

## 2023-07-10 DIAGNOSIS — E78.2 MIXED DYSLIPIDEMIA: ICD-10-CM

## 2023-07-10 DIAGNOSIS — Z11.59 NEED FOR HEPATITIS C SCREENING TEST: ICD-10-CM

## 2023-07-10 DIAGNOSIS — R35.1 BPH ASSOCIATED WITH NOCTURIA: ICD-10-CM

## 2023-07-10 DIAGNOSIS — R73.01 ELEVATED FASTING GLUCOSE: ICD-10-CM

## 2023-07-10 DIAGNOSIS — Z13.0 SCREENING FOR DEFICIENCY ANEMIA: ICD-10-CM

## 2023-07-10 DIAGNOSIS — M10.9 ACUTE GOUT OF RIGHT FOOT, UNSPECIFIED CAUSE: ICD-10-CM

## 2023-07-10 LAB
ALBUMIN SERPL-MCNC: 4 G/DL (ref 3.6–5.1)
ALBUMIN/GLOB SERPL: 1.2 {RATIO} (ref 1–2.4)
ALP SERPL-CCNC: 78 UNITS/L (ref 45–117)
ALT SERPL-CCNC: 43 UNITS/L
ANION GAP SERPL CALC-SCNC: 15 MMOL/L (ref 7–19)
AST SERPL-CCNC: 24 UNITS/L
BASOPHILS # BLD: 0 K/MCL (ref 0–0.3)
BASOPHILS NFR BLD: 1 %
BILIRUB SERPL-MCNC: 0.6 MG/DL (ref 0.2–1)
BUN SERPL-MCNC: 21 MG/DL (ref 6–20)
BUN/CREAT SERPL: 21 (ref 7–25)
CALCIUM SERPL-MCNC: 9.5 MG/DL (ref 8.4–10.2)
CHLORIDE SERPL-SCNC: 101 MMOL/L (ref 97–110)
CHOLEST SERPL-MCNC: 215 MG/DL
CHOLEST/HDLC SERPL: 2.7 {RATIO}
CO2 SERPL-SCNC: 29 MMOL/L (ref 21–32)
CREAT SERPL-MCNC: 0.99 MG/DL (ref 0.67–1.17)
DEPRECATED RDW RBC: 44.3 FL (ref 39–50)
EOSINOPHIL # BLD: 0 K/MCL (ref 0–0.5)
EOSINOPHIL NFR BLD: 1 %
ERYTHROCYTE [DISTWIDTH] IN BLOOD: 12.9 % (ref 11–15)
FASTING DURATION TIME PATIENT: 12 HOURS (ref 0–999)
GFR SERPLBLD BASED ON 1.73 SQ M-ARVRAT: 84 ML/MIN
GLOBULIN SER-MCNC: 3.4 G/DL (ref 2–4)
GLUCOSE SERPL-MCNC: 93 MG/DL (ref 70–99)
HBA1C MFR BLD: 5.6 % (ref 4.5–5.6)
HCT VFR BLD CALC: 39.3 % (ref 39–51)
HDLC SERPL-MCNC: 79 MG/DL
HGB BLD-MCNC: 13.7 G/DL (ref 13–17)
IMM GRANULOCYTES # BLD AUTO: 0 K/MCL (ref 0–0.2)
IMM GRANULOCYTES # BLD: 0 %
LDLC SERPL CALC-MCNC: 124 MG/DL
LYMPHOCYTES # BLD: 2 K/MCL (ref 1–4)
LYMPHOCYTES NFR BLD: 46 %
MCH RBC QN AUTO: 32.7 PG (ref 26–34)
MCHC RBC AUTO-ENTMCNC: 34.9 G/DL (ref 32–36.5)
MCV RBC AUTO: 93.8 FL (ref 78–100)
MONOCYTES # BLD: 0.5 K/MCL (ref 0.3–0.9)
MONOCYTES NFR BLD: 11 %
NEUTROPHILS # BLD: 1.8 K/MCL (ref 1.8–7.7)
NEUTROPHILS NFR BLD: 41 %
NONHDLC SERPL-MCNC: 136 MG/DL
NRBC BLD MANUAL-RTO: 0 /100 WBC
PLATELET # BLD AUTO: 237 K/MCL (ref 140–450)
POTASSIUM SERPL-SCNC: 4.8 MMOL/L (ref 3.4–5.1)
PROT SERPL-MCNC: 7.4 G/DL (ref 6.4–8.2)
RBC # BLD: 4.19 MIL/MCL (ref 4.5–5.9)
SODIUM SERPL-SCNC: 140 MMOL/L (ref 135–145)
TRIGL SERPL-MCNC: 58 MG/DL
URATE SERPL-MCNC: 6 MG/DL (ref 3.5–7.2)
WBC # BLD: 4.3 K/MCL (ref 4.2–11)

## 2023-07-10 PROCEDURE — G0472 HEP C SCREEN HIGH RISK/OTHER: HCPCS | Performed by: CLINICAL MEDICAL LABORATORY

## 2023-07-10 PROCEDURE — 80061 LIPID PANEL: CPT | Performed by: CLINICAL MEDICAL LABORATORY

## 2023-07-10 PROCEDURE — 85025 COMPLETE CBC W/AUTO DIFF WBC: CPT | Performed by: CLINICAL MEDICAL LABORATORY

## 2023-07-10 PROCEDURE — 80053 COMPREHEN METABOLIC PANEL: CPT | Performed by: CLINICAL MEDICAL LABORATORY

## 2023-07-10 PROCEDURE — 84550 ASSAY OF BLOOD/URIC ACID: CPT | Performed by: CLINICAL MEDICAL LABORATORY

## 2023-07-10 PROCEDURE — 84153 ASSAY OF PSA TOTAL: CPT | Performed by: CLINICAL MEDICAL LABORATORY

## 2023-07-10 PROCEDURE — 83036 HEMOGLOBIN GLYCOSYLATED A1C: CPT | Performed by: CLINICAL MEDICAL LABORATORY

## 2023-07-10 PROCEDURE — 36415 COLL VENOUS BLD VENIPUNCTURE: CPT | Performed by: INTERNAL MEDICINE

## 2023-07-11 LAB
HCV AB SER QL: NEGATIVE
PSA SERPL-MCNC: 0.88 NG/ML

## 2023-07-17 ENCOUNTER — OFFICE VISIT (OUTPATIENT)
Dept: FAMILY MEDICINE | Age: 68
End: 2023-07-17

## 2023-07-17 VITALS
SYSTOLIC BLOOD PRESSURE: 134 MMHG | DIASTOLIC BLOOD PRESSURE: 86 MMHG | BODY MASS INDEX: 33.03 KG/M2 | OXYGEN SATURATION: 98 % | HEIGHT: 77 IN | WEIGHT: 279.76 LBS | HEART RATE: 69 BPM

## 2023-07-17 DIAGNOSIS — Z23 NEED FOR VACCINATION: ICD-10-CM

## 2023-07-17 DIAGNOSIS — R73.01 ELEVATED FASTING GLUCOSE: ICD-10-CM

## 2023-07-17 DIAGNOSIS — N40.1 BPH ASSOCIATED WITH NOCTURIA: ICD-10-CM

## 2023-07-17 DIAGNOSIS — M10.9 ACUTE GOUT OF RIGHT FOOT, UNSPECIFIED CAUSE: ICD-10-CM

## 2023-07-17 DIAGNOSIS — F33.40 MAJOR DEPRESSIVE DISORDER, RECURRENT, IN REMISSION (CMD): ICD-10-CM

## 2023-07-17 DIAGNOSIS — R35.1 BPH ASSOCIATED WITH NOCTURIA: ICD-10-CM

## 2023-07-17 DIAGNOSIS — Z13.0 SCREENING FOR DEFICIENCY ANEMIA: ICD-10-CM

## 2023-07-17 DIAGNOSIS — E78.2 MIXED DYSLIPIDEMIA: ICD-10-CM

## 2023-07-17 DIAGNOSIS — I10 ESSENTIAL HYPERTENSION: Primary | ICD-10-CM

## 2023-07-17 DIAGNOSIS — Z00.00 MEDICARE ANNUAL WELLNESS VISIT, SUBSEQUENT: ICD-10-CM

## 2023-07-17 DIAGNOSIS — Z13.1 SCREENING FOR DIABETES MELLITUS (DM): ICD-10-CM

## 2023-07-17 PROCEDURE — 99213 OFFICE O/P EST LOW 20 MIN: CPT | Performed by: NURSE PRACTITIONER

## 2023-07-17 PROCEDURE — G0009 ADMIN PNEUMOCOCCAL VACCINE: HCPCS | Performed by: NURSE PRACTITIONER

## 2023-07-17 PROCEDURE — G0439 PPPS, SUBSEQ VISIT: HCPCS | Performed by: NURSE PRACTITIONER

## 2023-07-17 PROCEDURE — 90677 PCV20 VACCINE IM: CPT | Performed by: NURSE PRACTITIONER

## 2023-07-17 RX ORDER — ATORVASTATIN CALCIUM 40 MG/1
40 TABLET, FILM COATED ORAL DAILY
Qty: 90 TABLET | Refills: 3 | Status: SHIPPED | OUTPATIENT
Start: 2023-07-17

## 2023-07-17 RX ORDER — METOPROLOL SUCCINATE 100 MG/1
50 TABLET, EXTENDED RELEASE ORAL DAILY
Qty: 45 TABLET | Refills: 3 | Status: SHIPPED | OUTPATIENT
Start: 2023-07-17

## 2023-07-17 RX ORDER — CHLORTHALIDONE 25 MG/1
25 TABLET ORAL DAILY
Qty: 90 TABLET | Refills: 3 | Status: SHIPPED | OUTPATIENT
Start: 2023-07-17

## 2023-07-17 RX ORDER — ALLOPURINOL 100 MG/1
100 TABLET ORAL DAILY
Qty: 90 TABLET | Refills: 3 | Status: SHIPPED | OUTPATIENT
Start: 2023-07-17

## 2023-07-17 RX ORDER — DOXAZOSIN 2 MG/1
2 TABLET ORAL NIGHTLY
Qty: 90 TABLET | Refills: 3 | Status: SHIPPED | OUTPATIENT
Start: 2023-07-17

## 2023-07-17 RX ORDER — CITALOPRAM 20 MG/1
20 TABLET ORAL DAILY
Qty: 90 TABLET | Refills: 3 | Status: SHIPPED | OUTPATIENT
Start: 2023-07-17

## 2023-07-17 RX ORDER — AMLODIPINE BESYLATE 10 MG/1
10 TABLET ORAL DAILY
Qty: 90 TABLET | Refills: 3 | Status: SHIPPED | OUTPATIENT
Start: 2023-07-17

## 2023-07-17 RX ORDER — IRBESARTAN 300 MG/1
300 TABLET ORAL DAILY
Qty: 90 TABLET | Refills: 3 | Status: SHIPPED | OUTPATIENT
Start: 2023-07-17

## 2023-07-17 ASSESSMENT — PATIENT HEALTH QUESTIONNAIRE - PHQ9
CLINICAL INTERPRETATION OF PHQ2 SCORE: NO FURTHER SCREENING NEEDED
2. FEELING DOWN, DEPRESSED OR HOPELESS: NOT AT ALL
2. FEELING DOWN, DEPRESSED OR HOPELESS: NOT AT ALL
CLINICAL INTERPRETATION OF PHQ2 SCORE: 0
SUM OF ALL RESPONSES TO PHQ9 QUESTIONS 1 AND 2: 0
1. LITTLE INTEREST OR PLEASURE IN DOING THINGS: NOT AT ALL
2. FEELING DOWN, DEPRESSED OR HOPELESS: NOT AT ALL
CLINICAL INTERPRETATION OF PHQ2 SCORE: NO FURTHER SCREENING NEEDED
SUM OF ALL RESPONSES TO PHQ9 QUESTIONS 1 AND 2: 0
1. LITTLE INTEREST OR PLEASURE IN DOING THINGS: NOT AT ALL
CLINICAL INTERPRETATION OF PHQ2 SCORE: NO FURTHER SCREENING NEEDED
SUM OF ALL RESPONSES TO PHQ9 QUESTIONS 1 AND 2: 0
1. LITTLE INTEREST OR PLEASURE IN DOING THINGS: NOT AT ALL

## 2023-08-25 ASSESSMENT — ACTIVITIES OF DAILY LIVING (ADL)
ADL_SHORT_OF_BREATH: NO
ADL_SCORE: 12
ADL_BEFORE_ADMISSION: INDEPENDENT
NEEDS_ASSIST: NO
SENSORY_SUPPORT_DEVICES: HEARING AIDS;EYEGLASSES
RECENT_DECLINE_ADL: NO
HISTORY OF FALLING IN THE LAST YEAR (PRIOR TO ADMISSION): NO
CHRONIC_PAIN_PRESENT: NO

## 2023-08-25 ASSESSMENT — COGNITIVE AND FUNCTIONAL STATUS - GENERAL
ARE YOU DEAF OR DO YOU HAVE SERIOUS DIFFICULTY  HEARING: NO
ARE YOU BLIND OR DO YOU HAVE SERIOUS DIFFICULTY SEEING, EVEN WHEN WEARING GLASSES: NO

## 2023-08-28 ENCOUNTER — ANESTHESIA EVENT (OUTPATIENT)
Dept: SURGERY | Age: 68
End: 2023-08-28

## 2023-08-28 ENCOUNTER — HOSPITAL ENCOUNTER (OUTPATIENT)
Age: 68
Discharge: HOME | End: 2023-08-28
Attending: INTERNAL MEDICINE | Admitting: INTERNAL MEDICINE

## 2023-08-28 ENCOUNTER — ANESTHESIA (OUTPATIENT)
Dept: SURGERY | Age: 68
End: 2023-08-28

## 2023-08-28 VITALS
DIASTOLIC BLOOD PRESSURE: 78 MMHG | SYSTOLIC BLOOD PRESSURE: 121 MMHG | OXYGEN SATURATION: 96 % | RESPIRATION RATE: 11 BRPM | TEMPERATURE: 97.6 F | HEART RATE: 53 BPM

## 2023-08-28 DIAGNOSIS — Z86.010 HISTORY OF COLON POLYPS: ICD-10-CM

## 2023-08-28 DIAGNOSIS — Z12.11 COLON CANCER SCREENING: ICD-10-CM

## 2023-08-28 PROCEDURE — 10003428 HB COLONOSCOPY

## 2023-08-28 PROCEDURE — 10006391 HB COMPLEX PROCEDURE: Performed by: INTERNAL MEDICINE

## 2023-08-28 PROCEDURE — 10004560 HB COUNTER-EXTENDED RECOVERY PER HOUR

## 2023-08-28 PROCEDURE — 10001568 HB ANESTH MAC IV ADD'L 1/2 HR: Performed by: INTERNAL MEDICINE

## 2023-08-28 PROCEDURE — 45385 COLONOSCOPY W/LESION REMOVAL: CPT | Performed by: INTERNAL MEDICINE

## 2023-08-28 PROCEDURE — 10002803 HB RX 637: Performed by: INTERNAL MEDICINE

## 2023-08-28 PROCEDURE — 10002767 HB EXTENDED RECOVERY PER HOUR: Performed by: INTERNAL MEDICINE

## 2023-08-28 PROCEDURE — 10002800 HB RX 250 W HCPCS: Performed by: NURSE ANESTHETIST, CERTIFIED REGISTERED

## 2023-08-28 PROCEDURE — 10004316 HB COUNTER-PREP

## 2023-08-28 PROCEDURE — 10002117 HB ADDITIONAL SURGERY TIME/30 MIN: Performed by: INTERNAL MEDICINE

## 2023-08-28 PROCEDURE — 10002807 HB RX 258: Performed by: NURSE ANESTHETIST, CERTIFIED REGISTERED

## 2023-08-28 PROCEDURE — 10002362 HB ANESTH MAC IV 1ST 1/2 HR: Performed by: INTERNAL MEDICINE

## 2023-08-28 PROCEDURE — 88305 TISSUE EXAM BY PATHOLOGIST: CPT | Performed by: INTERNAL MEDICINE

## 2023-08-28 PROCEDURE — 10002801 HB RX 250 W/O HCPCS: Performed by: NURSE ANESTHETIST, CERTIFIED REGISTERED

## 2023-08-28 PROCEDURE — A45385 ANES COLONOSCOPY FLEX; W/REMOV TUMOR/LES: Performed by: NURSE ANESTHETIST, CERTIFIED REGISTERED

## 2023-08-28 RX ORDER — 0.9 % SODIUM CHLORIDE 0.9 %
2 VIAL (ML) INJECTION EVERY 12 HOURS SCHEDULED
Status: DISCONTINUED | OUTPATIENT
Start: 2023-08-28 | End: 2023-08-28 | Stop reason: HOSPADM

## 2023-08-28 RX ORDER — SIMETHICONE 20 MG/.3ML
EMULSION ORAL PRN
Status: DISCONTINUED | OUTPATIENT
Start: 2023-08-28 | End: 2023-08-28 | Stop reason: HOSPADM

## 2023-08-28 RX ORDER — SODIUM CHLORIDE 9 MG/ML
INJECTION, SOLUTION INTRAVENOUS CONTINUOUS
Status: DISCONTINUED | OUTPATIENT
Start: 2023-08-28 | End: 2023-08-28 | Stop reason: HOSPADM

## 2023-08-28 RX ORDER — SODIUM CHLORIDE, SODIUM LACTATE, POTASSIUM CHLORIDE, CALCIUM CHLORIDE 600; 310; 30; 20 MG/100ML; MG/100ML; MG/100ML; MG/100ML
INJECTION, SOLUTION INTRAVENOUS CONTINUOUS
Status: DISCONTINUED | OUTPATIENT
Start: 2023-08-28 | End: 2023-08-28 | Stop reason: HOSPADM

## 2023-08-28 RX ORDER — HUMAN INSULIN 100 [IU]/ML
INJECTION, SOLUTION SUBCUTANEOUS
Status: DISCONTINUED | OUTPATIENT
Start: 2023-08-28 | End: 2023-08-28 | Stop reason: HOSPADM

## 2023-08-28 RX ORDER — DEXTROSE MONOHYDRATE 50 MG/ML
INJECTION, SOLUTION INTRAVENOUS CONTINUOUS PRN
Status: DISCONTINUED | OUTPATIENT
Start: 2023-08-28 | End: 2023-08-28 | Stop reason: HOSPADM

## 2023-08-28 RX ORDER — PROPOFOL 10 MG/ML
INJECTION, EMULSION INTRAVENOUS PRN
Status: DISCONTINUED | OUTPATIENT
Start: 2023-08-28 | End: 2023-08-28

## 2023-08-28 RX ORDER — LIDOCAINE HYDROCHLORIDE 10 MG/ML
1 INJECTION, SOLUTION INFILTRATION; PERINEURAL PRN
Status: DISCONTINUED | OUTPATIENT
Start: 2023-08-28 | End: 2023-08-28 | Stop reason: HOSPADM

## 2023-08-28 RX ORDER — NICOTINE POLACRILEX 4 MG
30 LOZENGE BUCCAL
Status: DISCONTINUED | OUTPATIENT
Start: 2023-08-28 | End: 2023-08-28 | Stop reason: HOSPADM

## 2023-08-28 RX ORDER — LIDOCAINE HYDROCHLORIDE 10 MG/ML
INJECTION, SOLUTION INFILTRATION; PERINEURAL PRN
Status: DISCONTINUED | OUTPATIENT
Start: 2023-08-28 | End: 2023-08-28

## 2023-08-28 RX ORDER — DEXTROSE MONOHYDRATE 25 G/50ML
25 INJECTION, SOLUTION INTRAVENOUS PRN
Status: DISCONTINUED | OUTPATIENT
Start: 2023-08-28 | End: 2023-08-28 | Stop reason: HOSPADM

## 2023-08-28 RX ADMIN — PROPOFOL 50 MG: 10 INJECTION, EMULSION INTRAVENOUS at 09:46

## 2023-08-28 RX ADMIN — LIDOCAINE HYDROCHLORIDE 5 ML: 10 INJECTION, SOLUTION INFILTRATION; PERINEURAL at 09:46

## 2023-08-28 RX ADMIN — PROPOFOL 150 MCG/KG/MIN: 10 INJECTION, EMULSION INTRAVENOUS at 09:46

## 2023-08-28 RX ADMIN — SODIUM CHLORIDE, POTASSIUM CHLORIDE, SODIUM LACTATE AND CALCIUM CHLORIDE: 600; 310; 30; 20 INJECTION, SOLUTION INTRAVENOUS at 09:27

## 2023-08-28 ASSESSMENT — PAIN SCALES - GENERAL
PAINLEVEL_OUTOF10: 0

## 2023-08-29 ENCOUNTER — TELEPHONE (OUTPATIENT)
Dept: GASTROENTEROLOGY | Age: 68
End: 2023-08-29

## 2023-08-29 LAB
ASR DISCLAIMER: NORMAL
CASE RPRT: NORMAL
CLINICAL INFO: NORMAL
PATH REPORT.FINAL DX SPEC: NORMAL
PATH REPORT.GROSS SPEC: NORMAL
PATH REPORT.MICROSCOPIC SPEC OTHER STN: NORMAL

## 2024-03-04 ENCOUNTER — TELEPHONE (OUTPATIENT)
Dept: OTHER | Age: 69
End: 2024-03-04

## 2024-03-11 ENCOUNTER — IMAGING SERVICES (OUTPATIENT)
Dept: GENERAL RADIOLOGY | Age: 69
End: 2024-03-11
Attending: NURSE PRACTITIONER

## 2024-03-11 ENCOUNTER — APPOINTMENT (OUTPATIENT)
Dept: FAMILY MEDICINE | Age: 69
End: 2024-03-11

## 2024-03-11 VITALS
HEIGHT: 77 IN | BODY MASS INDEX: 33.84 KG/M2 | OXYGEN SATURATION: 100 % | WEIGHT: 286.6 LBS | DIASTOLIC BLOOD PRESSURE: 82 MMHG | HEART RATE: 82 BPM | SYSTOLIC BLOOD PRESSURE: 128 MMHG

## 2024-03-11 DIAGNOSIS — R35.1 BPH ASSOCIATED WITH NOCTURIA: ICD-10-CM

## 2024-03-11 DIAGNOSIS — R20.0 NUMBNESS AND TINGLING IN BOTH HANDS: ICD-10-CM

## 2024-03-11 DIAGNOSIS — N40.1 BPH ASSOCIATED WITH NOCTURIA: ICD-10-CM

## 2024-03-11 DIAGNOSIS — M1A.0710 CHRONIC GOUT OF RIGHT FOOT, UNSPECIFIED CAUSE: ICD-10-CM

## 2024-03-11 DIAGNOSIS — R20.2 NUMBNESS AND TINGLING IN BOTH HANDS: ICD-10-CM

## 2024-03-11 DIAGNOSIS — E78.2 MIXED DYSLIPIDEMIA: ICD-10-CM

## 2024-03-11 DIAGNOSIS — S19.9XXA INJURY OF NECK, INITIAL ENCOUNTER: ICD-10-CM

## 2024-03-11 DIAGNOSIS — Z00.00 MEDICARE ANNUAL WELLNESS VISIT, SUBSEQUENT: ICD-10-CM

## 2024-03-11 DIAGNOSIS — F33.40 MAJOR DEPRESSIVE DISORDER, RECURRENT, IN REMISSION (CMD): ICD-10-CM

## 2024-03-11 DIAGNOSIS — I10 ESSENTIAL HYPERTENSION: Primary | ICD-10-CM

## 2024-03-11 PROCEDURE — 72040 X-RAY EXAM NECK SPINE 2-3 VW: CPT | Performed by: RADIOLOGY

## 2024-03-11 RX ORDER — DOXAZOSIN 2 MG/1
2 TABLET ORAL NIGHTLY
Qty: 90 TABLET | Refills: 3 | Status: SHIPPED | OUTPATIENT
Start: 2024-03-11

## 2024-03-11 RX ORDER — ALLOPURINOL 100 MG/1
100 TABLET ORAL DAILY
Qty: 90 TABLET | Refills: 3 | Status: SHIPPED | OUTPATIENT
Start: 2024-03-11

## 2024-03-11 RX ORDER — CHLORTHALIDONE 25 MG/1
25 TABLET ORAL DAILY
Qty: 90 TABLET | Refills: 3 | Status: SHIPPED | OUTPATIENT
Start: 2024-03-11

## 2024-03-11 RX ORDER — IRBESARTAN 300 MG/1
300 TABLET ORAL DAILY
Qty: 90 TABLET | Refills: 3 | Status: SHIPPED | OUTPATIENT
Start: 2024-03-11

## 2024-03-11 RX ORDER — METOPROLOL SUCCINATE 100 MG/1
50 TABLET, EXTENDED RELEASE ORAL DAILY
Qty: 45 TABLET | Refills: 3 | Status: SHIPPED | OUTPATIENT
Start: 2024-03-11

## 2024-03-11 RX ORDER — ATORVASTATIN CALCIUM 40 MG/1
40 TABLET, FILM COATED ORAL DAILY
Qty: 90 TABLET | Refills: 3 | Status: SHIPPED | OUTPATIENT
Start: 2024-03-11

## 2024-03-11 RX ORDER — AMLODIPINE BESYLATE 10 MG/1
10 TABLET ORAL DAILY
Qty: 90 TABLET | Refills: 3 | Status: SHIPPED | OUTPATIENT
Start: 2024-03-11

## 2024-03-11 RX ORDER — PREDNISONE 20 MG/1
40 TABLET ORAL DAILY
Qty: 10 TABLET | Refills: 0 | Status: SHIPPED | OUTPATIENT
Start: 2024-03-11

## 2024-03-11 RX ORDER — CITALOPRAM 20 MG/1
20 TABLET ORAL DAILY
Qty: 90 TABLET | Refills: 3 | Status: SHIPPED | OUTPATIENT
Start: 2024-03-11

## 2024-03-11 ASSESSMENT — PATIENT HEALTH QUESTIONNAIRE - PHQ9
4. FEELING TIRED OR HAVING LITTLE ENERGY: NOT AT ALL
6. FEELING BAD ABOUT YOURSELF - OR THAT YOU ARE A FAILURE OR HAVE LET YOURSELF OR YOUR FAMILY DOWN: NOT AT ALL
9. THOUGHTS THAT YOU WOULD BE BETTER OFF DEAD, OR OF HURTING YOURSELF: NOT AT ALL
7. TROUBLE CONCENTRATING ON THINGS, SUCH AS READING THE NEWSPAPER OR WATCHING TELEVISION: NOT AT ALL
SUM OF ALL RESPONSES TO PHQ QUESTIONS 1-9: 0
SUM OF ALL RESPONSES TO PHQ9 QUESTIONS 1 AND 2: 0
SUM OF ALL RESPONSES TO PHQ9 QUESTIONS 1 AND 2: 0
CLINICAL INTERPRETATION OF PHQ2 SCORE: NO FURTHER SCREENING NEEDED
8. MOVING OR SPEAKING SO SLOWLY THAT OTHER PEOPLE COULD HAVE NOTICED. OR THE OPPOSITE, BEING SO FIGETY OR RESTLESS THAT YOU HAVE BEEN MOVING AROUND A LOT MORE THAN USUAL: NOT AT ALL
3. TROUBLE FALLING OR STAYING ASLEEP OR SLEEPING TOO MUCH: NOT AT ALL
2. FEELING DOWN, DEPRESSED OR HOPELESS: NOT AT ALL
5. POOR APPETITE, WEIGHT LOSS, OR OVEREATING: NOT AT ALL
1. LITTLE INTEREST OR PLEASURE IN DOING THINGS: NOT AT ALL

## 2024-03-14 ENCOUNTER — TELEPHONE (OUTPATIENT)
Dept: FAMILY MEDICINE | Age: 69
End: 2024-03-14

## 2024-03-14 DIAGNOSIS — M54.2 NECK PAIN: ICD-10-CM

## 2024-03-14 DIAGNOSIS — R20.0 NUMBNESS AND TINGLING IN BOTH HANDS: Primary | ICD-10-CM

## 2024-03-14 DIAGNOSIS — R20.2 NUMBNESS AND TINGLING IN BOTH HANDS: Primary | ICD-10-CM

## 2024-03-19 ENCOUNTER — E-ADVICE (OUTPATIENT)
Dept: FAMILY MEDICINE | Age: 69
End: 2024-03-19

## 2024-03-19 RX ORDER — DIAZEPAM 5 MG/1
TABLET ORAL
Qty: 2 TABLET | Refills: 0 | Status: SHIPPED | OUTPATIENT
Start: 2024-03-19

## 2024-03-26 ENCOUNTER — HOSPITAL ENCOUNTER (OUTPATIENT)
Dept: MRI IMAGING | Age: 69
Discharge: HOME | End: 2024-03-26
Attending: NURSE PRACTITIONER

## 2024-03-26 DIAGNOSIS — R20.2 NUMBNESS AND TINGLING IN BOTH HANDS: ICD-10-CM

## 2024-03-26 DIAGNOSIS — R20.0 NUMBNESS AND TINGLING IN BOTH HANDS: ICD-10-CM

## 2024-03-26 DIAGNOSIS — M54.2 NECK PAIN: ICD-10-CM

## 2024-03-26 PROCEDURE — 72141 MRI NECK SPINE W/O DYE: CPT

## 2024-04-02 ENCOUNTER — TELEPHONE (OUTPATIENT)
Dept: FAMILY MEDICINE | Age: 69
End: 2024-04-02

## 2024-04-02 DIAGNOSIS — M54.2 CERVICAL SPINE PAIN: Primary | ICD-10-CM

## 2024-04-02 DIAGNOSIS — M50.90 CERVICAL DISC DISEASE: ICD-10-CM

## 2024-04-17 PROBLEM — M47.812 CERVICAL SPONDYLOSIS: Status: ACTIVE | Noted: 2024-04-17

## 2024-06-13 PROBLEM — E78.5 HYPERLIPIDEMIA: Status: ACTIVE | Noted: 2020-07-02

## 2024-06-13 PROBLEM — Z97.4 WEARS HEARING AID IN BOTH EARS: Status: ACTIVE | Noted: 2023-04-13

## 2024-06-13 PROBLEM — H90.3 SENSORINEURAL HEARING LOSS (SNHL) OF BOTH EARS: Status: ACTIVE | Noted: 2023-03-30

## 2024-06-13 PROBLEM — N40.0 BENIGN PROSTATIC HYPERPLASIA: Status: ACTIVE | Noted: 2020-07-02

## 2024-06-13 PROBLEM — H93.13 TINNITUS, BILATERAL: Status: ACTIVE | Noted: 2023-03-30

## 2024-07-11 ENCOUNTER — APPOINTMENT (OUTPATIENT)
Dept: LAB | Age: 69
End: 2024-07-11

## 2024-07-11 DIAGNOSIS — Z13.1 SCREENING FOR DIABETES MELLITUS (DM): ICD-10-CM

## 2024-07-11 DIAGNOSIS — R35.1 BPH ASSOCIATED WITH NOCTURIA: ICD-10-CM

## 2024-07-11 DIAGNOSIS — N40.1 BPH ASSOCIATED WITH NOCTURIA: ICD-10-CM

## 2024-07-11 DIAGNOSIS — R73.01 ELEVATED FASTING GLUCOSE: ICD-10-CM

## 2024-07-11 DIAGNOSIS — E78.2 MIXED DYSLIPIDEMIA: ICD-10-CM

## 2024-07-11 DIAGNOSIS — Z13.0 SCREENING FOR DEFICIENCY ANEMIA: ICD-10-CM

## 2024-07-11 LAB
ALBUMIN SERPL-MCNC: 3.8 G/DL (ref 3.6–5.1)
ALBUMIN/GLOB SERPL: 1.2 {RATIO} (ref 1–2.4)
ALP SERPL-CCNC: 69 UNITS/L (ref 45–117)
ALT SERPL-CCNC: 28 UNITS/L
ANION GAP SERPL CALC-SCNC: 11 MMOL/L (ref 7–19)
AST SERPL-CCNC: 17 UNITS/L
BASOPHILS # BLD: 0 K/MCL (ref 0–0.3)
BASOPHILS NFR BLD: 1 %
BILIRUB SERPL-MCNC: 0.8 MG/DL (ref 0.2–1)
BUN SERPL-MCNC: 25 MG/DL (ref 6–20)
BUN/CREAT SERPL: 19 (ref 7–25)
CALCIUM SERPL-MCNC: 9.2 MG/DL (ref 8.4–10.2)
CHLORIDE SERPL-SCNC: 100 MMOL/L (ref 97–110)
CHOLEST SERPL-MCNC: 174 MG/DL
CHOLEST/HDLC SERPL: 2.9 {RATIO}
CO2 SERPL-SCNC: 30 MMOL/L (ref 21–32)
CREAT SERPL-MCNC: 1.31 MG/DL (ref 0.67–1.17)
DEPRECATED RDW RBC: 44.2 FL (ref 39–50)
EGFRCR SERPLBLD CKD-EPI 2021: 60 ML/MIN/{1.73_M2}
EOSINOPHIL # BLD: 0 K/MCL (ref 0–0.5)
EOSINOPHIL NFR BLD: 1 %
ERYTHROCYTE [DISTWIDTH] IN BLOOD: 13.1 % (ref 11–15)
FASTING DURATION TIME PATIENT: 12 HOURS (ref 0–999)
GLOBULIN SER-MCNC: 3.2 G/DL (ref 2–4)
GLUCOSE SERPL-MCNC: 113 MG/DL (ref 70–99)
HCT VFR BLD CALC: 40.7 % (ref 39–51)
HDLC SERPL-MCNC: 60 MG/DL
HGB BLD-MCNC: 14 G/DL (ref 13–17)
IMM GRANULOCYTES # BLD AUTO: 0 K/MCL (ref 0–0.2)
IMM GRANULOCYTES # BLD: 0 %
LDLC SERPL CALC-MCNC: 99 MG/DL
LYMPHOCYTES # BLD: 2.1 K/MCL (ref 1–4)
LYMPHOCYTES NFR BLD: 46 %
MCH RBC QN AUTO: 31.9 PG (ref 26–34)
MCHC RBC AUTO-ENTMCNC: 34.4 G/DL (ref 32–36.5)
MCV RBC AUTO: 92.7 FL (ref 78–100)
MONOCYTES # BLD: 0.5 K/MCL (ref 0.3–0.9)
MONOCYTES NFR BLD: 10 %
NEUTROPHILS # BLD: 1.9 K/MCL (ref 1.8–7.7)
NEUTROPHILS NFR BLD: 42 %
NONHDLC SERPL-MCNC: 114 MG/DL
NRBC BLD MANUAL-RTO: 0 /100 WBC
PLATELET # BLD AUTO: 280 K/MCL (ref 140–450)
POTASSIUM SERPL-SCNC: 4 MMOL/L (ref 3.4–5.1)
PROT SERPL-MCNC: 7 G/DL (ref 6.4–8.2)
RBC # BLD: 4.39 MIL/MCL (ref 4.5–5.9)
SODIUM SERPL-SCNC: 137 MMOL/L (ref 135–145)
TRIGL SERPL-MCNC: 73 MG/DL
WBC # BLD: 4.6 K/MCL (ref 4.2–11)

## 2024-07-11 PROCEDURE — 85025 COMPLETE CBC W/AUTO DIFF WBC: CPT | Performed by: CLINICAL MEDICAL LABORATORY

## 2024-07-11 PROCEDURE — 80053 COMPREHEN METABOLIC PANEL: CPT | Performed by: CLINICAL MEDICAL LABORATORY

## 2024-07-11 PROCEDURE — 84153 ASSAY OF PSA TOTAL: CPT | Performed by: CLINICAL MEDICAL LABORATORY

## 2024-07-11 PROCEDURE — 36415 COLL VENOUS BLD VENIPUNCTURE: CPT | Performed by: INTERNAL MEDICINE

## 2024-07-11 PROCEDURE — 83036 HEMOGLOBIN GLYCOSYLATED A1C: CPT | Performed by: CLINICAL MEDICAL LABORATORY

## 2024-07-11 PROCEDURE — 80061 LIPID PANEL: CPT | Performed by: CLINICAL MEDICAL LABORATORY

## 2024-07-12 LAB
HBA1C MFR BLD: 5.6 % (ref 4.5–5.6)
PSA SERPL-MCNC: 0.73 NG/ML

## 2024-07-25 ENCOUNTER — APPOINTMENT (OUTPATIENT)
Dept: FAMILY MEDICINE | Age: 69
End: 2024-07-25

## 2024-07-25 ENCOUNTER — LAB SERVICES (OUTPATIENT)
Dept: LAB | Age: 69
End: 2024-07-25

## 2024-07-25 VITALS
HEIGHT: 77 IN | HEART RATE: 74 BPM | DIASTOLIC BLOOD PRESSURE: 82 MMHG | OXYGEN SATURATION: 97 % | BODY MASS INDEX: 32.72 KG/M2 | WEIGHT: 277.12 LBS | SYSTOLIC BLOOD PRESSURE: 132 MMHG

## 2024-07-25 DIAGNOSIS — G56.02 CARPAL TUNNEL SYNDROME OF LEFT WRIST: ICD-10-CM

## 2024-07-25 DIAGNOSIS — G56.01 CARPAL TUNNEL SYNDROME OF RIGHT WRIST: ICD-10-CM

## 2024-07-25 DIAGNOSIS — Z01.818 PREOP EXAMINATION: Primary | ICD-10-CM

## 2024-07-25 LAB
BASOPHILS # BLD: 0 K/MCL (ref 0–0.3)
BASOPHILS NFR BLD: 1 %
DEPRECATED RDW RBC: 48.9 FL (ref 39–50)
EOSINOPHIL # BLD: 0 K/MCL (ref 0–0.5)
EOSINOPHIL NFR BLD: 1 %
ERYTHROCYTE [DISTWIDTH] IN BLOOD: 13.6 % (ref 11–15)
HCT VFR BLD CALC: 39.7 % (ref 39–51)
HGB BLD-MCNC: 13.6 G/DL (ref 13–17)
IMM GRANULOCYTES # BLD AUTO: 0 K/MCL (ref 0–0.2)
IMM GRANULOCYTES # BLD: 0 %
LYMPHOCYTES # BLD: 2.2 K/MCL (ref 1–4)
LYMPHOCYTES NFR BLD: 42 %
MCH RBC QN AUTO: 33.5 PG (ref 26–34)
MCHC RBC AUTO-ENTMCNC: 34.3 G/DL (ref 32–36.5)
MCV RBC AUTO: 97.8 FL (ref 78–100)
MONOCYTES # BLD: 0.6 K/MCL (ref 0.3–0.9)
MONOCYTES NFR BLD: 11 %
NEUTROPHILS # BLD: 2.5 K/MCL (ref 1.8–7.7)
NEUTROPHILS NFR BLD: 45 %
NRBC BLD MANUAL-RTO: 0 /100 WBC
PLATELET # BLD AUTO: 252 K/MCL (ref 140–450)
PLATELET # BLD AUTO: ABNORMAL 10*3/UL
RBC # BLD: 4.06 MIL/MCL (ref 4.5–5.9)
WBC # BLD: 5.4 K/MCL (ref 4.2–11)

## 2024-07-25 PROCEDURE — 99214 OFFICE O/P EST MOD 30 MIN: CPT | Performed by: NURSE PRACTITIONER

## 2024-07-25 PROCEDURE — 93000 ELECTROCARDIOGRAM COMPLETE: CPT | Performed by: NURSE PRACTITIONER

## 2024-07-25 PROCEDURE — 85049 AUTOMATED PLATELET COUNT: CPT | Performed by: CLINICAL MEDICAL LABORATORY

## 2024-07-25 PROCEDURE — 80048 BASIC METABOLIC PNL TOTAL CA: CPT | Performed by: CLINICAL MEDICAL LABORATORY

## 2024-07-25 PROCEDURE — 36415 COLL VENOUS BLD VENIPUNCTURE: CPT | Performed by: INTERNAL MEDICINE

## 2024-07-25 PROCEDURE — 85025 COMPLETE CBC W/AUTO DIFF WBC: CPT | Performed by: CLINICAL MEDICAL LABORATORY

## 2024-07-25 RX ORDER — AMLODIPINE BESYLATE 10 MG/1
10 TABLET ORAL DAILY
Qty: 90 TABLET | Refills: 2 | Status: SHIPPED | OUTPATIENT
Start: 2024-07-25

## 2024-07-25 RX ORDER — CHLORTHALIDONE 25 MG/1
25 TABLET ORAL DAILY
Qty: 90 TABLET | Refills: 2 | Status: SHIPPED | OUTPATIENT
Start: 2024-07-25

## 2024-07-25 RX ORDER — CITALOPRAM 20 MG/1
20 TABLET ORAL DAILY
Qty: 90 TABLET | Refills: 2 | Status: SHIPPED | OUTPATIENT
Start: 2024-07-25

## 2024-07-25 RX ORDER — ALLOPURINOL 100 MG/1
100 TABLET ORAL DAILY
Qty: 90 TABLET | Refills: 2 | Status: SHIPPED | OUTPATIENT
Start: 2024-07-25

## 2024-07-25 RX ORDER — ATORVASTATIN CALCIUM 40 MG/1
40 TABLET, FILM COATED ORAL DAILY
Qty: 90 TABLET | Refills: 2 | Status: SHIPPED | OUTPATIENT
Start: 2024-07-25

## 2024-07-26 LAB
ANION GAP SERPL CALC-SCNC: 11 MMOL/L (ref 7–19)
BUN SERPL-MCNC: 24 MG/DL (ref 6–20)
BUN/CREAT SERPL: 19 (ref 7–25)
CALCIUM SERPL-MCNC: 9.1 MG/DL (ref 8.4–10.2)
CHLORIDE SERPL-SCNC: 99 MMOL/L (ref 97–110)
CO2 SERPL-SCNC: 32 MMOL/L (ref 21–32)
CREAT SERPL-MCNC: 1.29 MG/DL (ref 0.67–1.17)
EGFRCR SERPLBLD CKD-EPI 2021: 61 ML/MIN/{1.73_M2}
FASTING DURATION TIME PATIENT: ABNORMAL H
GLUCOSE SERPL-MCNC: 99 MG/DL (ref 70–99)
POTASSIUM SERPL-SCNC: 4.3 MMOL/L (ref 3.4–5.1)
SODIUM SERPL-SCNC: 138 MMOL/L (ref 135–145)

## 2024-07-29 ENCOUNTER — TELEPHONE (OUTPATIENT)
Dept: FAMILY MEDICINE | Age: 69
End: 2024-07-29

## 2024-08-12 ENCOUNTER — ANESTHESIA EVENT (OUTPATIENT)
Dept: SURGERY | Age: 69
End: 2024-08-12

## 2024-08-12 ENCOUNTER — ANESTHESIA (OUTPATIENT)
Dept: SURGERY | Age: 69
End: 2024-08-12

## 2024-08-12 ENCOUNTER — HOSPITAL ENCOUNTER (OUTPATIENT)
Age: 69
Discharge: HOME | End: 2024-08-12
Attending: NEUROLOGICAL SURGERY | Admitting: NEUROLOGICAL SURGERY

## 2024-08-12 VITALS
HEIGHT: 77 IN | WEIGHT: 280.2 LBS | TEMPERATURE: 97 F | DIASTOLIC BLOOD PRESSURE: 88 MMHG | SYSTOLIC BLOOD PRESSURE: 126 MMHG | BODY MASS INDEX: 33.09 KG/M2 | RESPIRATION RATE: 16 BRPM | HEART RATE: 60 BPM | OXYGEN SATURATION: 95 %

## 2024-08-12 DIAGNOSIS — G89.18 ACUTE POSTOPERATIVE PAIN: ICD-10-CM

## 2024-08-12 DIAGNOSIS — G56.22 ULNAR NEUROPATHY OF LEFT UPPER EXTREMITY: Primary | ICD-10-CM

## 2024-08-12 LAB — POTASSIUM SERPL-SCNC: 3.9 MMOL/L (ref 3.4–5.1)

## 2024-08-12 PROCEDURE — 36415 COLL VENOUS BLD VENIPUNCTURE: CPT | Performed by: NEUROLOGICAL SURGERY

## 2024-08-12 PROCEDURE — 10002767 HB EXTENDED RECOVERY PER HOUR: Performed by: NEUROLOGICAL SURGERY

## 2024-08-12 PROCEDURE — 10002800 HB RX 250 W HCPCS: Performed by: NEUROLOGICAL SURGERY

## 2024-08-12 PROCEDURE — 10001568 HB ANESTH MAC IV ADD'L 1/2 HR: Performed by: NEUROLOGICAL SURGERY

## 2024-08-12 PROCEDURE — 10006398 HB NEURO BASIC: Performed by: NEUROLOGICAL SURGERY

## 2024-08-12 PROCEDURE — 10002807 HB RX 258: Performed by: NURSE ANESTHETIST, CERTIFIED REGISTERED

## 2024-08-12 PROCEDURE — 10006023 HB SUPPLY 272: Performed by: NEUROLOGICAL SURGERY

## 2024-08-12 PROCEDURE — 10002807 HB RX 258: Performed by: ANESTHESIOLOGY

## 2024-08-12 PROCEDURE — A6403 STERILE GAUZE>16 <= 48 SQ IN: HCPCS | Performed by: NEUROLOGICAL SURGERY

## 2024-08-12 PROCEDURE — 10004316 HB COUNTER-PREP

## 2024-08-12 PROCEDURE — 84132 ASSAY OF SERUM POTASSIUM: CPT | Performed by: NEUROLOGICAL SURGERY

## 2024-08-12 PROCEDURE — 10002800 HB RX 250 W HCPCS: Performed by: NURSE ANESTHETIST, CERTIFIED REGISTERED

## 2024-08-12 PROCEDURE — 10002117 HB ADDITIONAL SURGERY TIME/30 MIN: Performed by: NEUROLOGICAL SURGERY

## 2024-08-12 PROCEDURE — 10002362 HB ANESTH MAC IV 1ST 1/2 HR: Performed by: NEUROLOGICAL SURGERY

## 2024-08-12 RX ORDER — PROCHLORPERAZINE EDISYLATE 5 MG/ML
5 INJECTION INTRAMUSCULAR; INTRAVENOUS EVERY 4 HOURS PRN
Status: DISCONTINUED | OUTPATIENT
Start: 2024-08-12 | End: 2024-08-12 | Stop reason: HOSPADM

## 2024-08-12 RX ORDER — SODIUM CHLORIDE 9 MG/ML
INJECTION, SOLUTION INTRAVENOUS CONTINUOUS
Status: DISCONTINUED | OUTPATIENT
Start: 2024-08-12 | End: 2024-08-12 | Stop reason: HOSPADM

## 2024-08-12 RX ORDER — PROMETHAZINE HYDROCHLORIDE 25 MG/1
25 TABLET ORAL EVERY 6 HOURS PRN
Status: DISCONTINUED | OUTPATIENT
Start: 2024-08-12 | End: 2024-08-12 | Stop reason: HOSPADM

## 2024-08-12 RX ORDER — DIPHENHYDRAMINE HYDROCHLORIDE 50 MG/ML
12.5 INJECTION INTRAMUSCULAR; INTRAVENOUS EVERY 4 HOURS PRN
Status: DISCONTINUED | OUTPATIENT
Start: 2024-08-12 | End: 2024-08-12 | Stop reason: HOSPADM

## 2024-08-12 RX ORDER — ACETAMINOPHEN 650 MG/1
650 SUPPOSITORY RECTAL EVERY 4 HOURS PRN
Status: DISCONTINUED | OUTPATIENT
Start: 2024-08-12 | End: 2024-08-12 | Stop reason: HOSPADM

## 2024-08-12 RX ORDER — SODIUM CHLORIDE, SODIUM LACTATE, POTASSIUM CHLORIDE, CALCIUM CHLORIDE 600; 310; 30; 20 MG/100ML; MG/100ML; MG/100ML; MG/100ML
INJECTION, SOLUTION INTRAVENOUS CONTINUOUS PRN
Status: DISCONTINUED | OUTPATIENT
Start: 2024-08-12 | End: 2024-08-12

## 2024-08-12 RX ORDER — DROPERIDOL 2.5 MG/ML
0.62 INJECTION, SOLUTION INTRAMUSCULAR; INTRAVENOUS
Status: DISCONTINUED | OUTPATIENT
Start: 2024-08-12 | End: 2024-08-12 | Stop reason: HOSPADM

## 2024-08-12 RX ORDER — 0.9 % SODIUM CHLORIDE 0.9 %
2 VIAL (ML) INJECTION EVERY 12 HOURS SCHEDULED
Status: DISCONTINUED | OUTPATIENT
Start: 2024-08-12 | End: 2024-08-12 | Stop reason: HOSPADM

## 2024-08-12 RX ORDER — BUPIVACAINE HYDROCHLORIDE 5 MG/ML
INJECTION, SOLUTION EPIDURAL; INTRACAUDAL PRN
Status: DISCONTINUED | OUTPATIENT
Start: 2024-08-12 | End: 2024-08-12 | Stop reason: HOSPADM

## 2024-08-12 RX ORDER — SODIUM CHLORIDE, SODIUM LACTATE, POTASSIUM CHLORIDE, CALCIUM CHLORIDE 600; 310; 30; 20 MG/100ML; MG/100ML; MG/100ML; MG/100ML
INJECTION, SOLUTION INTRAVENOUS CONTINUOUS
Status: DISCONTINUED | OUTPATIENT
Start: 2024-08-12 | End: 2024-08-12 | Stop reason: HOSPADM

## 2024-08-12 RX ORDER — DIPHENHYDRAMINE HYDROCHLORIDE 50 MG/ML
12.5 INJECTION INTRAMUSCULAR; INTRAVENOUS
Status: DISCONTINUED | OUTPATIENT
Start: 2024-08-12 | End: 2024-08-12 | Stop reason: HOSPADM

## 2024-08-12 RX ORDER — PROCHLORPERAZINE EDISYLATE 5 MG/ML
5 INJECTION INTRAMUSCULAR; INTRAVENOUS
Status: DISCONTINUED | OUTPATIENT
Start: 2024-08-12 | End: 2024-08-12 | Stop reason: HOSPADM

## 2024-08-12 RX ORDER — ONDANSETRON 2 MG/ML
4 INJECTION INTRAMUSCULAR; INTRAVENOUS 2 TIMES DAILY PRN
Status: DISCONTINUED | OUTPATIENT
Start: 2024-08-12 | End: 2024-08-12 | Stop reason: HOSPADM

## 2024-08-12 RX ORDER — MIDAZOLAM HYDROCHLORIDE 1 MG/ML
INJECTION, SOLUTION INTRAMUSCULAR; INTRAVENOUS PRN
Status: DISCONTINUED | OUTPATIENT
Start: 2024-08-12 | End: 2024-08-12

## 2024-08-12 RX ORDER — ONDANSETRON 2 MG/ML
4 INJECTION INTRAMUSCULAR; INTRAVENOUS
Status: DISCONTINUED | OUTPATIENT
Start: 2024-08-12 | End: 2024-08-12 | Stop reason: HOSPADM

## 2024-08-12 RX ORDER — HYDROCODONE BITARTRATE AND ACETAMINOPHEN 5; 325 MG/1; MG/1
1 TABLET ORAL EVERY 8 HOURS PRN
Qty: 10 TABLET | Refills: 0 | Status: SHIPPED | OUTPATIENT
Start: 2024-08-12

## 2024-08-12 RX ORDER — HUMAN INSULIN 100 [IU]/ML
INJECTION, SOLUTION SUBCUTANEOUS
Status: DISCONTINUED | OUTPATIENT
Start: 2024-08-12 | End: 2024-08-12 | Stop reason: HOSPADM

## 2024-08-12 RX ORDER — PROPOFOL 10 MG/ML
INJECTION, EMULSION INTRAVENOUS PRN
Status: DISCONTINUED | OUTPATIENT
Start: 2024-08-12 | End: 2024-08-12

## 2024-08-12 RX ORDER — PROCHLORPERAZINE MALEATE 5 MG
5 TABLET ORAL
Status: DISCONTINUED | OUTPATIENT
Start: 2024-08-12 | End: 2024-08-12 | Stop reason: HOSPADM

## 2024-08-12 RX ORDER — HYDRALAZINE HYDROCHLORIDE 20 MG/ML
5 INJECTION INTRAMUSCULAR; INTRAVENOUS EVERY 10 MIN PRN
Status: DISCONTINUED | OUTPATIENT
Start: 2024-08-12 | End: 2024-08-12 | Stop reason: HOSPADM

## 2024-08-12 RX ORDER — HYDROCODONE BITARTRATE AND ACETAMINOPHEN 5; 325 MG/1; MG/1
1 TABLET ORAL EVERY 4 HOURS PRN
Status: DISCONTINUED | OUTPATIENT
Start: 2024-08-12 | End: 2024-08-12 | Stop reason: HOSPADM

## 2024-08-12 RX ORDER — ACETAMINOPHEN 325 MG/1
650 TABLET ORAL EVERY 4 HOURS PRN
Status: DISCONTINUED | OUTPATIENT
Start: 2024-08-12 | End: 2024-08-12 | Stop reason: HOSPADM

## 2024-08-12 RX ADMIN — PROPOFOL 50 MG: 10 INJECTION, EMULSION INTRAVENOUS at 14:31

## 2024-08-12 RX ADMIN — SODIUM CHLORIDE, POTASSIUM CHLORIDE, SODIUM LACTATE AND CALCIUM CHLORIDE: 600; 310; 30; 20 INJECTION, SOLUTION INTRAVENOUS at 14:26

## 2024-08-12 RX ADMIN — PROPOFOL 30 MG: 10 INJECTION, EMULSION INTRAVENOUS at 14:34

## 2024-08-12 RX ADMIN — MIDAZOLAM HYDROCHLORIDE 2 MG: 1 INJECTION, SOLUTION INTRAMUSCULAR; INTRAVENOUS at 14:25

## 2024-08-12 RX ADMIN — FENTANYL CITRATE 100 MCG: 50 INJECTION INTRAMUSCULAR; INTRAVENOUS at 14:25

## 2024-08-12 RX ADMIN — PROPOFOL 50 MG: 10 INJECTION, EMULSION INTRAVENOUS at 15:06

## 2024-08-12 RX ADMIN — PROPOFOL 30 MG: 10 INJECTION, EMULSION INTRAVENOUS at 14:41

## 2024-08-12 RX ADMIN — PROPOFOL INJECTABLE EMULSION 50 MCG/KG/MIN: 10 INJECTION, EMULSION INTRAVENOUS at 14:28

## 2024-08-12 RX ADMIN — SODIUM CHLORIDE, POTASSIUM CHLORIDE, SODIUM LACTATE AND CALCIUM CHLORIDE: 600; 310; 30; 20 INJECTION, SOLUTION INTRAVENOUS at 13:12

## 2024-08-12 ASSESSMENT — PAIN SCALES - GENERAL
PAINLEVEL_OUTOF10: 0

## 2024-08-12 ASSESSMENT — ACTIVITIES OF DAILY LIVING (ADL)
SENSORY_SUPPORT_DEVICES: HEARING AIDS;EYEGLASSES
RECENT_DECLINE_ADL: NO
ADL_SHORT_OF_BREATH: YES
HISTORY OF FALLING IN THE LAST YEAR (PRIOR TO ADMISSION): NO
ADL_SCORE: 12
NEEDS_ASSIST: NO
ADL_BEFORE_ADMISSION: INDEPENDENT
CHRONIC_PAIN_PRESENT: NO

## 2024-08-26 ASSESSMENT — ACTIVITIES OF DAILY LIVING (ADL)
CHRONIC_PAIN_PRESENT: NO
HISTORY OF FALLING IN THE LAST YEAR (PRIOR TO ADMISSION): NO
ADL_SHORT_OF_BREATH: NO
SENSORY_SUPPORT_DEVICES: EYEGLASSES
ADL_BEFORE_ADMISSION: INDEPENDENT
DESCRIBE HOW PAIN IMPACTS YOUR LIFE: NONE/CAN MANAGE
NEEDS_ASSIST: NO
ADL_SCORE: 12
RECENT_DECLINE_ADL: NO

## 2024-08-28 ENCOUNTER — HOSPITAL ENCOUNTER (OUTPATIENT)
Age: 69
Discharge: HOME | End: 2024-08-28
Attending: NEUROLOGICAL SURGERY | Admitting: NEUROLOGICAL SURGERY

## 2024-08-28 ENCOUNTER — ANESTHESIA EVENT (OUTPATIENT)
Dept: SURGERY | Age: 69
End: 2024-08-28

## 2024-08-28 ENCOUNTER — ANESTHESIA (OUTPATIENT)
Dept: SURGERY | Age: 69
End: 2024-08-28

## 2024-08-28 DIAGNOSIS — G56.01 CARPAL TUNNEL SYNDROME OF RIGHT WRIST: Primary | ICD-10-CM

## 2024-08-28 DIAGNOSIS — G56.21 ULNAR TUNNEL SYNDROME OF RIGHT WRIST: ICD-10-CM

## 2024-08-28 PROCEDURE — 10006398 HB NEURO BASIC: Performed by: NEUROLOGICAL SURGERY

## 2024-08-28 PROCEDURE — 10001568 HB ANESTH MAC IV ADD'L 1/2 HR: Performed by: NEUROLOGICAL SURGERY

## 2024-08-28 PROCEDURE — 10004316 HB COUNTER-PREP

## 2024-08-28 PROCEDURE — 10002362 HB ANESTH MAC IV 1ST 1/2 HR: Performed by: NEUROLOGICAL SURGERY

## 2024-08-28 PROCEDURE — 10002800 HB RX 250 W HCPCS: Performed by: NEUROLOGICAL SURGERY

## 2024-08-28 PROCEDURE — 10002117 HB ADDITIONAL SURGERY TIME/30 MIN: Performed by: NEUROLOGICAL SURGERY

## 2024-08-28 PROCEDURE — 10002800 HB RX 250 W HCPCS: Performed by: ANESTHESIOLOGY

## 2024-08-28 PROCEDURE — 10002801 HB RX 250 W/O HCPCS: Performed by: NEUROLOGICAL SURGERY

## 2024-08-28 PROCEDURE — 10002807 HB RX 258: Performed by: ANESTHESIOLOGY

## 2024-08-28 PROCEDURE — 10006023 HB SUPPLY 272: Performed by: NEUROLOGICAL SURGERY

## 2024-08-28 PROCEDURE — A6403 STERILE GAUZE>16 <= 48 SQ IN: HCPCS | Performed by: NEUROLOGICAL SURGERY

## 2024-08-28 PROCEDURE — 10004348 HB COUNTER-EVAL PRE-OP

## 2024-08-28 PROCEDURE — 10002767 HB EXTENDED RECOVERY PER HOUR: Performed by: NEUROLOGICAL SURGERY

## 2024-08-28 RX ORDER — MAGNESIUM HYDROXIDE 1200 MG/15ML
LIQUID ORAL PRN
Status: DISCONTINUED | OUTPATIENT
Start: 2024-08-28 | End: 2024-08-28 | Stop reason: HOSPADM

## 2024-08-28 RX ORDER — ACETAMINOPHEN 650 MG/1
650 SUPPOSITORY RECTAL EVERY 4 HOURS PRN
Status: DISCONTINUED | OUTPATIENT
Start: 2024-08-28 | End: 2024-08-28 | Stop reason: HOSPADM

## 2024-08-28 RX ORDER — ONDANSETRON 4 MG/1
4 TABLET, ORALLY DISINTEGRATING ORAL EVERY 12 HOURS PRN
Status: DISCONTINUED | OUTPATIENT
Start: 2024-08-28 | End: 2024-08-28 | Stop reason: HOSPADM

## 2024-08-28 RX ORDER — BISACODYL 10 MG
10 SUPPOSITORY, RECTAL RECTAL DAILY PRN
Status: DISCONTINUED | OUTPATIENT
Start: 2024-08-28 | End: 2024-08-28 | Stop reason: HOSPADM

## 2024-08-28 RX ORDER — SODIUM CHLORIDE, SODIUM LACTATE, POTASSIUM CHLORIDE, CALCIUM CHLORIDE 600; 310; 30; 20 MG/100ML; MG/100ML; MG/100ML; MG/100ML
INJECTION, SOLUTION INTRAVENOUS CONTINUOUS PRN
Status: DISCONTINUED | OUTPATIENT
Start: 2024-08-28 | End: 2024-08-28

## 2024-08-28 RX ORDER — HYDROCODONE BITARTRATE AND ACETAMINOPHEN 5; 325 MG/1; MG/1
1 TABLET ORAL EVERY 6 HOURS PRN
Qty: 15 TABLET | Refills: 0 | Status: SHIPPED | OUTPATIENT
Start: 2024-08-28

## 2024-08-28 RX ORDER — ONDANSETRON 2 MG/ML
4 INJECTION INTRAMUSCULAR; INTRAVENOUS EVERY 12 HOURS PRN
Status: DISCONTINUED | OUTPATIENT
Start: 2024-08-28 | End: 2024-08-28 | Stop reason: HOSPADM

## 2024-08-28 RX ORDER — CYCLOBENZAPRINE HCL 5 MG
10 TABLET ORAL 3 TIMES DAILY PRN
Status: DISCONTINUED | OUTPATIENT
Start: 2024-08-28 | End: 2024-08-28 | Stop reason: HOSPADM

## 2024-08-28 RX ORDER — ACETAMINOPHEN 325 MG/1
650 TABLET ORAL EVERY 4 HOURS PRN
Status: DISCONTINUED | OUTPATIENT
Start: 2024-08-28 | End: 2024-08-28 | Stop reason: HOSPADM

## 2024-08-28 RX ORDER — MIDAZOLAM HYDROCHLORIDE 1 MG/ML
INJECTION, SOLUTION INTRAMUSCULAR; INTRAVENOUS PRN
Status: DISCONTINUED | OUTPATIENT
Start: 2024-08-28 | End: 2024-08-28

## 2024-08-28 RX ORDER — 0.9 % SODIUM CHLORIDE 0.9 %
2 VIAL (ML) INJECTION EVERY 12 HOURS SCHEDULED
Status: DISCONTINUED | OUTPATIENT
Start: 2024-08-28 | End: 2024-08-28 | Stop reason: HOSPADM

## 2024-08-28 RX ORDER — DIPHENHYDRAMINE HCL 25 MG
25 CAPSULE ORAL EVERY 6 HOURS PRN
Status: DISCONTINUED | OUTPATIENT
Start: 2024-08-28 | End: 2024-08-28 | Stop reason: HOSPADM

## 2024-08-28 RX ORDER — CALCIUM CARBONATE 500 MG/1
1000 TABLET, CHEWABLE ORAL EVERY 4 HOURS PRN
Status: DISCONTINUED | OUTPATIENT
Start: 2024-08-28 | End: 2024-08-28 | Stop reason: HOSPADM

## 2024-08-28 RX ORDER — POLYETHYLENE GLYCOL 3350 17 G/17G
17 POWDER, FOR SOLUTION ORAL DAILY PRN
Status: DISCONTINUED | OUTPATIENT
Start: 2024-08-28 | End: 2024-08-28 | Stop reason: HOSPADM

## 2024-08-28 RX ORDER — BUPIVACAINE HYDROCHLORIDE 5 MG/ML
INJECTION, SOLUTION EPIDURAL; INTRACAUDAL PRN
Status: DISCONTINUED | OUTPATIENT
Start: 2024-08-28 | End: 2024-08-28 | Stop reason: HOSPADM

## 2024-08-28 RX ORDER — AMOXICILLIN 250 MG
2 CAPSULE ORAL 2 TIMES DAILY PRN
Status: DISCONTINUED | OUTPATIENT
Start: 2024-08-28 | End: 2024-08-28 | Stop reason: HOSPADM

## 2024-08-28 RX ORDER — HYDROCODONE BITARTRATE AND ACETAMINOPHEN 5; 325 MG/1; MG/1
1 TABLET ORAL EVERY 4 HOURS PRN
Status: DISCONTINUED | OUTPATIENT
Start: 2024-08-28 | End: 2024-08-28 | Stop reason: HOSPADM

## 2024-08-28 RX ADMIN — MIDAZOLAM HYDROCHLORIDE 2 MG: 1 INJECTION, SOLUTION INTRAMUSCULAR; INTRAVENOUS at 10:25

## 2024-08-28 RX ADMIN — SODIUM CHLORIDE, POTASSIUM CHLORIDE, SODIUM LACTATE AND CALCIUM CHLORIDE: 600; 310; 30; 20 INJECTION, SOLUTION INTRAVENOUS at 10:25

## 2024-08-28 RX ADMIN — PROPOFOL INJECTABLE EMULSION 120 MCG/KG/MIN: 10 INJECTION, EMULSION INTRAVENOUS at 10:27

## 2024-08-28 RX ADMIN — FENTANYL CITRATE 100 MCG: 50 INJECTION INTRAMUSCULAR; INTRAVENOUS at 10:25

## 2024-08-28 ASSESSMENT — PAIN SCALES - GENERAL
PAINLEVEL_OUTOF10: 0

## 2024-08-29 VITALS
BODY MASS INDEX: 33.32 KG/M2 | WEIGHT: 282.19 LBS | OXYGEN SATURATION: 98 % | RESPIRATION RATE: 16 BRPM | HEIGHT: 77 IN | TEMPERATURE: 97.4 F | HEART RATE: 60 BPM | SYSTOLIC BLOOD PRESSURE: 134 MMHG | DIASTOLIC BLOOD PRESSURE: 68 MMHG

## 2024-09-10 ENCOUNTER — APPOINTMENT (OUTPATIENT)
Dept: FAMILY MEDICINE | Age: 69
End: 2024-09-10

## 2024-09-10 VITALS
BODY MASS INDEX: 33.06 KG/M2 | WEIGHT: 279.98 LBS | HEART RATE: 68 BPM | SYSTOLIC BLOOD PRESSURE: 132 MMHG | DIASTOLIC BLOOD PRESSURE: 82 MMHG | OXYGEN SATURATION: 95 % | HEIGHT: 77 IN

## 2024-09-10 DIAGNOSIS — F33.1 MAJOR DEPRESSIVE DISORDER, RECURRENT EPISODE, MODERATE  (CMD): Primary | ICD-10-CM

## 2024-09-10 DIAGNOSIS — F41.9 ANXIETY: ICD-10-CM

## 2024-09-10 PROCEDURE — 99213 OFFICE O/P EST LOW 20 MIN: CPT | Performed by: NURSE PRACTITIONER

## 2024-09-10 RX ORDER — ALLOPURINOL 100 MG/1
100 TABLET ORAL DAILY
Qty: 90 TABLET | Refills: 2 | Status: CANCELLED | OUTPATIENT
Start: 2024-09-10

## 2024-09-10 RX ORDER — ATORVASTATIN CALCIUM 40 MG/1
40 TABLET, FILM COATED ORAL DAILY
Qty: 90 TABLET | Refills: 2 | Status: CANCELLED | OUTPATIENT
Start: 2024-09-10

## 2024-09-10 RX ORDER — DOXAZOSIN 2 MG/1
2 TABLET ORAL NIGHTLY
Qty: 90 TABLET | Refills: 3 | Status: CANCELLED | OUTPATIENT
Start: 2024-09-10

## 2024-09-10 RX ORDER — CITALOPRAM HYDROBROMIDE 20 MG/1
20 TABLET ORAL DAILY
Qty: 90 TABLET | Refills: 2 | Status: CANCELLED | OUTPATIENT
Start: 2024-09-10

## 2024-09-10 RX ORDER — CHLORTHALIDONE 25 MG/1
25 TABLET ORAL DAILY
Qty: 90 TABLET | Refills: 2 | Status: CANCELLED | OUTPATIENT
Start: 2024-09-10

## 2024-09-10 RX ORDER — IRBESARTAN 300 MG/1
300 TABLET ORAL DAILY
Qty: 90 TABLET | Refills: 3 | Status: CANCELLED | OUTPATIENT
Start: 2024-09-10

## 2024-09-10 RX ORDER — AMLODIPINE BESYLATE 10 MG/1
10 TABLET ORAL DAILY
Qty: 90 TABLET | Refills: 2 | Status: CANCELLED | OUTPATIENT
Start: 2024-09-10

## 2024-09-10 RX ORDER — CITALOPRAM HYDROBROMIDE 20 MG/1
30 TABLET ORAL DAILY
Qty: 135 TABLET | Refills: 3 | Status: SHIPPED | OUTPATIENT
Start: 2024-09-10

## 2024-09-10 RX ORDER — METOPROLOL SUCCINATE 100 MG/1
50 TABLET, EXTENDED RELEASE ORAL DAILY
Qty: 45 TABLET | Refills: 3 | Status: CANCELLED | OUTPATIENT
Start: 2024-09-10

## 2024-09-10 ASSESSMENT — ANXIETY QUESTIONNAIRES
2. NOT BEING ABLE TO STOP OR CONTROL WORRYING: NOT AT ALL
2. NOT BEING ABLE TO STOP OR CONTROL WORRYING: 0
1. FEELING NERVOUS, ANXIOUS, OR ON EDGE: 0
7. FEELING AFRAID AS IF SOMETHING AWFUL MIGHT HAPPEN: NOT AT ALL
6. BECOMING EASILY ANNOYED OR IRRITABLE: 1
5. BEING SO RESTLESS THAT IT IS HARD TO SIT STILL: NOT AT ALL
4. TROUBLE RELAXING: NOT AT ALL
3. WORRYING TOO MUCH ABOUT DIFFERENT THINGS: SEVERAL DAYS
6. BECOMING EASILY ANNOYED OR IRRITABLE: SEVERAL DAYS
IF YOU CHECKED OFF ANY PROBLEMS ON THIS QUESTIONNAIRE, HOW DIFFICULT HAVE THESE PROBLEMS MADE IT FOR YOU TO DO YOUR WORK, TAKE CARE OF THINGS AT HOME, OR GET ALONG WITH OTHER PEOPLE: NOT DIFFICULT AT ALL
7. FEELING AFRAID AS IF SOMETHING AWFUL MIGHT HAPPEN: 0
4. TROUBLE RELAXING: 0
1. FEELING NERVOUS, ANXIOUS, OR ON EDGE: NOT AT ALL
5. BEING SO RESTLESS THAT IT IS HARD TO SIT STILL: 0
3. WORRYING TOO MUCH ABOUT DIFFERENT THINGS: 1
GAD7 TOTAL SCORE: 2

## 2024-09-10 ASSESSMENT — PATIENT HEALTH QUESTIONNAIRE - PHQ9
CLINICAL INTERPRETATION OF PHQ2 SCORE: FURTHER SCREENING NEEDED
7. TROUBLE CONCENTRATING ON THINGS, SUCH AS READING THE NEWSPAPER OR WATCHING TELEVISION: SEVERAL DAYS
SUM OF ALL RESPONSES TO PHQ QUESTIONS 1-9: 12
8. MOVING OR SPEAKING SO SLOWLY THAT OTHER PEOPLE COULD HAVE NOTICED. OR THE OPPOSITE, BEING SO FIGETY OR RESTLESS THAT YOU HAVE BEEN MOVING AROUND A LOT MORE THAN USUAL: NOT AT ALL
SUM OF ALL RESPONSES TO PHQ9 QUESTIONS 1 AND 2: 3
CLINICAL INTERPRETATION OF PHQ9 SCORE: MODERATE DEPRESSION
SUM OF ALL RESPONSES TO PHQ9 QUESTIONS 1 AND 2: 3
6. FEELING BAD ABOUT YOURSELF - OR THAT YOU ARE A FAILURE OR HAVE LET YOURSELF OR YOUR FAMILY DOWN: SEVERAL DAYS
1. LITTLE INTEREST OR PLEASURE IN DOING THINGS: MORE THAN HALF THE DAYS
5. POOR APPETITE, WEIGHT LOSS, OR OVEREATING: MORE THAN HALF THE DAYS
4. FEELING TIRED OR HAVING LITTLE ENERGY: MORE THAN HALF THE DAYS
2. FEELING DOWN, DEPRESSED OR HOPELESS: SEVERAL DAYS
9. THOUGHTS THAT YOU WOULD BE BETTER OFF DEAD, OR OF HURTING YOURSELF: NOT AT ALL
3. TROUBLE FALLING OR STAYING ASLEEP OR SLEEPING TOO MUCH: NEARLY EVERY DAY
10. IF YOU CHECKED OFF ANY PROBLEMS, HOW DIFFICULT HAVE THESE PROBLEMS MADE IT FOR YOU TO DO YOUR WORK, TAKE CARE OF THINGS AT HOME, OR GET ALONG WITH OTHER PEOPLE: SOMEWHAT DIFFICULT

## 2024-09-11 PROBLEM — G56.03 BILATERAL CARPAL TUNNEL SYNDROME: Status: ACTIVE | Noted: 2024-09-11

## 2025-01-24 ENCOUNTER — TELEPHONE (OUTPATIENT)
Age: 70
End: 2025-01-24

## 2025-04-04 RX ORDER — AMLODIPINE BESYLATE 10 MG/1
10 TABLET ORAL DAILY
Qty: 90 TABLET | Refills: 3 | Status: SHIPPED | OUTPATIENT
Start: 2025-04-04

## 2025-04-04 RX ORDER — ALLOPURINOL 100 MG/1
100 TABLET ORAL DAILY
Qty: 90 TABLET | Refills: 3 | Status: SHIPPED | OUTPATIENT
Start: 2025-04-04

## 2025-04-04 RX ORDER — ATORVASTATIN CALCIUM 40 MG/1
40 TABLET, FILM COATED ORAL DAILY
Qty: 90 TABLET | Refills: 3 | Status: SHIPPED | OUTPATIENT
Start: 2025-04-04

## 2025-04-04 RX ORDER — CHLORTHALIDONE 25 MG/1
25 TABLET ORAL DAILY
Qty: 90 TABLET | Refills: 3 | Status: SHIPPED | OUTPATIENT
Start: 2025-04-04

## 2025-05-01 RX ORDER — DOXAZOSIN 2 MG/1
2 TABLET ORAL NIGHTLY
Qty: 90 TABLET | Refills: 1 | Status: SHIPPED | OUTPATIENT
Start: 2025-05-01

## 2025-05-01 RX ORDER — METOPROLOL SUCCINATE 100 MG/1
50 TABLET, EXTENDED RELEASE ORAL DAILY
Qty: 45 TABLET | Refills: 1 | Status: SHIPPED | OUTPATIENT
Start: 2025-05-01

## 2025-05-01 RX ORDER — IRBESARTAN 300 MG/1
300 TABLET ORAL NIGHTLY
Qty: 90 TABLET | Refills: 0 | Status: SHIPPED | OUTPATIENT
Start: 2025-05-01

## 2025-05-06 ENCOUNTER — APPOINTMENT (OUTPATIENT)
Age: 70
End: 2025-05-06

## 2025-05-06 VITALS
HEIGHT: 77 IN | WEIGHT: 272.49 LBS | HEART RATE: 62 BPM | RESPIRATION RATE: 16 BRPM | SYSTOLIC BLOOD PRESSURE: 132 MMHG | DIASTOLIC BLOOD PRESSURE: 70 MMHG | TEMPERATURE: 97.9 F | BODY MASS INDEX: 32.17 KG/M2

## 2025-05-06 DIAGNOSIS — R35.1 BPH ASSOCIATED WITH NOCTURIA: ICD-10-CM

## 2025-05-06 DIAGNOSIS — N40.1 BPH ASSOCIATED WITH NOCTURIA: ICD-10-CM

## 2025-05-06 DIAGNOSIS — Z00.00 MEDICARE ANNUAL WELLNESS VISIT, SUBSEQUENT: Primary | ICD-10-CM

## 2025-05-06 DIAGNOSIS — E78.5 HYPERLIPIDEMIA, UNSPECIFIED HYPERLIPIDEMIA TYPE: ICD-10-CM

## 2025-05-06 DIAGNOSIS — F33.1 MAJOR DEPRESSIVE DISORDER, RECURRENT EPISODE, MODERATE (CMD): ICD-10-CM

## 2025-05-06 DIAGNOSIS — Z13.0 SCREENING FOR DEFICIENCY ANEMIA: ICD-10-CM

## 2025-05-06 DIAGNOSIS — R42 DIZZINESS: ICD-10-CM

## 2025-05-06 DIAGNOSIS — Z13.1 SCREENING FOR DIABETES MELLITUS (DM): ICD-10-CM

## 2025-05-06 DIAGNOSIS — I10 ESSENTIAL HYPERTENSION: ICD-10-CM

## 2025-05-06 DIAGNOSIS — M1A.0710 CHRONIC GOUT OF RIGHT FOOT, UNSPECIFIED CAUSE: ICD-10-CM

## 2025-05-06 PROBLEM — E78.2 MIXED DYSLIPIDEMIA: Status: RESOLVED | Noted: 2022-06-16 | Resolved: 2025-05-06

## 2025-05-06 PROBLEM — F33.0 MAJOR DEPRESSIVE DISORDER, RECURRENT EPISODE, MILD (CMD): Status: ACTIVE | Noted: 2022-06-16

## 2025-05-06 RX ORDER — ALLOPURINOL 100 MG/1
100 TABLET ORAL DAILY
Qty: 90 TABLET | Refills: 3 | Status: SHIPPED | OUTPATIENT
Start: 2025-05-06

## 2025-05-06 RX ORDER — CITALOPRAM HYDROBROMIDE 20 MG/1
30 TABLET ORAL DAILY
Qty: 135 TABLET | Refills: 3 | Status: SHIPPED | OUTPATIENT
Start: 2025-05-06

## 2025-05-06 RX ORDER — ATORVASTATIN CALCIUM 40 MG/1
40 TABLET, FILM COATED ORAL DAILY
Qty: 90 TABLET | Refills: 3 | Status: SHIPPED | OUTPATIENT
Start: 2025-05-06

## 2025-05-06 RX ORDER — IRBESARTAN 300 MG/1
300 TABLET ORAL DAILY
Qty: 90 TABLET | Refills: 3 | Status: SHIPPED | OUTPATIENT
Start: 2025-05-06

## 2025-05-06 RX ORDER — CHLORTHALIDONE 25 MG/1
25 TABLET ORAL DAILY
Qty: 90 TABLET | Refills: 3 | Status: SHIPPED | OUTPATIENT
Start: 2025-05-06

## 2025-05-06 RX ORDER — METOPROLOL SUCCINATE 100 MG/1
50 TABLET, EXTENDED RELEASE ORAL DAILY
Qty: 45 TABLET | Refills: 3 | Status: SHIPPED | OUTPATIENT
Start: 2025-05-06

## 2025-05-06 RX ORDER — DOXAZOSIN 2 MG/1
2 TABLET ORAL NIGHTLY
Qty: 90 TABLET | Refills: 3 | Status: SHIPPED | OUTPATIENT
Start: 2025-05-06

## 2025-05-06 RX ORDER — AMLODIPINE BESYLATE 10 MG/1
10 TABLET ORAL DAILY
Qty: 90 TABLET | Refills: 3 | Status: SHIPPED | OUTPATIENT
Start: 2025-05-06

## 2025-05-06 ASSESSMENT — PATIENT HEALTH QUESTIONNAIRE - PHQ9
CLINICAL INTERPRETATION OF PHQ2 SCORE: NO FURTHER SCREENING NEEDED
1. LITTLE INTEREST OR PLEASURE IN DOING THINGS: NOT AT ALL
SUM OF ALL RESPONSES TO PHQ9 QUESTIONS 1 AND 2: 0
SUM OF ALL RESPONSES TO PHQ9 QUESTIONS 1 AND 2: 0
2. FEELING DOWN, DEPRESSED OR HOPELESS: NOT AT ALL

## 2025-08-07 ENCOUNTER — APPOINTMENT (OUTPATIENT)
Dept: OPHTHALMOLOGY | Age: 70
End: 2025-08-07

## 2025-08-07 DIAGNOSIS — H52.203 HYPEROPIA OF BOTH EYES WITH ASTIGMATISM AND PRESBYOPIA: Primary | ICD-10-CM

## 2025-08-07 DIAGNOSIS — H52.03 HYPEROPIA OF BOTH EYES WITH ASTIGMATISM AND PRESBYOPIA: Primary | ICD-10-CM

## 2025-08-07 DIAGNOSIS — H52.4 HYPEROPIA OF BOTH EYES WITH ASTIGMATISM AND PRESBYOPIA: Primary | ICD-10-CM

## 2025-08-07 DIAGNOSIS — H25.13 SENILE NUCLEAR SCLEROSIS, BILATERAL: ICD-10-CM

## 2025-08-07 ASSESSMENT — VISUAL ACUITY
OD_CC: 20/40
OS_CC: 20/70
CORRECTION_TYPE: GLASSES
OD_CC: 20/20
OS_CC: 20/25
OS_CC: 20/40

## 2025-08-07 ASSESSMENT — REFRACTION
OS_AXIS: 085
OS_CYLINDER: -0.75
OS_SPHERE: +1.75
OD_CYLINDER: -1.00
OS_ADD: +2.50
OD_ADD: +2.50
OD_AXIS: 106
OD_SPHERE: +1.75

## 2025-08-07 ASSESSMENT — KERATOMETRY
OS_AXISANGLE_DEGREES: 030
OD_K1POWER_DIOPTERS: 43.00
OS_K1POWER_DIOPTERS: 43.25
OS_K2POWER_DIOPTERS: 4.50
OD_K2POWER_DIOPTERS: 43.25
METHOD_AUTO_MANUAL: AUTOMATED
OS_AXISANGLE2_DEGREES: 120
OD_AXISANGLE2_DEGREES: 125
OD_AXISANGLE_DEGREES: 035

## 2025-08-07 ASSESSMENT — REFRACTION_WEARINGRX
OS_ADD: +2.50
OS_CYLINDER: -0.75
OD_AXIS: 103
OS_SPHERE: +1.50
OD_ADD: +2.50
OD_SPHERE: +1.75
OD_CYLINDER: -1.00
OS_AXIS: 088

## 2025-08-07 ASSESSMENT — REFRACTION_MANIFEST
OD_CYLINDER: -1.00
OD_AXIS: 105
OS_CYLINDER: -0.50
METHOD_AUTOREFRACTION: 1
OS_AXIS: 090
OD_SPHERE: +1.75
OS_SPHERE: +1.75

## 2025-08-07 ASSESSMENT — CONF VISUAL FIELD
OD_NORMAL: 1
OD_SUPERIOR_NASAL_RESTRICTION: 0
METHOD: COUNTING FINGERS
OS_NORMAL: 1
OS_INFERIOR_NASAL_RESTRICTION: 0
OD_SUPERIOR_TEMPORAL_RESTRICTION: 0
OS_SUPERIOR_NASAL_RESTRICTION: 0
OD_INFERIOR_TEMPORAL_RESTRICTION: 0
OS_SUPERIOR_TEMPORAL_RESTRICTION: 0
OS_INFERIOR_TEMPORAL_RESTRICTION: 0
OD_INFERIOR_NASAL_RESTRICTION: 0

## 2025-08-07 ASSESSMENT — EXTERNAL EXAM - LEFT EYE: OS_EXAM: NORMAL

## 2025-08-07 ASSESSMENT — SLIT LAMP EXAM - LIDS
COMMENTS: DERMATOCHALASIS - UPPER LID
COMMENTS: DERMATOCHALASIS - UPPER LID

## 2025-08-07 ASSESSMENT — TONOMETRY
OD_IOP_MMHG: 18
OS_IOP_MMHG: 18
IOP_METHOD: TONOPEN

## 2025-08-07 ASSESSMENT — EXTERNAL EXAM - RIGHT EYE: OD_EXAM: NORMAL

## 2026-04-30 ENCOUNTER — APPOINTMENT (OUTPATIENT)
Dept: LAB | Age: 71
End: 2026-04-30

## 2026-05-07 ENCOUNTER — APPOINTMENT (OUTPATIENT)
Age: 71
End: 2026-05-07

## 2026-08-10 ENCOUNTER — APPOINTMENT (OUTPATIENT)
Dept: OPHTHALMOLOGY | Age: 71
End: 2026-08-10

## (undated) DEVICE — DRESSING WND 8X3IN ABS PAD NADH TELFA POLY CTN STRL LF

## (undated) DEVICE — COVER SRG CNTRL STRL LF LIGHT HNDL HARMONYAIR M SERIES HRMN

## (undated) DEVICE — GLOVE SURG 7 BIOGEL SRG LTX STRAW PF BEAD CUFF TXTR SRFC

## (undated) DEVICE — SNARE SM OVAL 195CMX13MM 2.4MM TRU-TRQ ROT STAR CATH MED

## (undated) DEVICE — CONTAINER SPEC 8OZ WHT SHORT WIDE THK WALL 3.74IN 2.21IN

## (undated) DEVICE — SUTURE VCL+ 3-0 SH 18IN CNTRL RELS BRAID 8 STRN

## (undated) DEVICE — GLOVE SURG 6.5 PROTEXIS LF BLUE PF SMTH BEAD CUFF INTLK STRL

## (undated) DEVICE — GOWN SURG XL L3 REINFORCE SET IN SLV STRL LF DISP BLUE

## (undated) DEVICE — CUFF TRNQT RED 18IN 2 PORT BLDR POS LOCK CNCT REPRO LF

## (undated) DEVICE — GLOVE SURG 7 PROTEXIS LF LIGHT BRN PF BEAD CUFF INDPD THUMB

## (undated) DEVICE — GLOVE SURG 8 INDCTR BIOGEL LTX GRN PF SMTH BEAD CUFF N-PYRG

## (undated) DEVICE — SPONGE GAUZE 6.75X6IN CTN ABS STRL LF BLK2

## (undated) DEVICE — BANDAGE CMPR TETRA-FLEX 5.5YDX3IN CLIP FREE CLSR ELASTIC

## (undated) DEVICE — TRAP MULTICHAMBER LF OPTMZ SPEC POLYP DISP NS

## (undated) DEVICE — STRAP PSTN 60X3IN DEVON KN VELCRO 2023 LF DISP

## (undated) DEVICE — GOWN SURG LG L3 REINFORCE SET IN SLV STRL LF DISP BLUE

## (undated) DEVICE — DRESSING WND 4.125YDX4.5IN 6 PLY ANTIMICROBIAL ABS KERLIX

## (undated) DEVICE — GLOVE SURG 7 PROTEXIS LF CRM PF BEAD CUFF STRL PLISPRN 12IN

## (undated) DEVICE — GLOVE SURG 7 PROTEXIS LF BLUE PF SMTH BEAD CUFF INTLK STRL

## (undated) DEVICE — NEEDLE HPO 25GA 1.5IN BVL STRL LF DISP JELCO NDL-PRO EDGE

## (undated) DEVICE — GLOVE SURG 7.5 PROTEXIS LF BLUE PF SMTH BEAD CUFF INTLK STRL

## (undated) DEVICE — SUTURE ETHILON 4-0 S-A PS-2 PRM MP POLYAMIDE 18IN BLACK

## (undated) DEVICE — CORD BP 12IN SILVERGLIDE STRGT CBL STRL LF DISP

## (undated) DEVICE — TOWEL OR 30X18IN BLUE LF STD

## (undated) DEVICE — Device

## (undated) DEVICE — BANDAGE CMPR TETRA-FLEX 5.5YDX4IN CLIP FREE CLSR ELASTIC

## (undated) DEVICE — WET SKIN PREP TRAY 1 EA THREE COMPARTMENT TRAY 2

## (undated) DEVICE — STOCKINETTE ORTHO 48X6IN CTN ALBAHEALTH 2 PLY PCUT LF TBLR

## (undated) DEVICE — GLOVE SURG 6.5 PROTEXIS LF CRM PF BEAD CUFF STRL PLISPRN

## (undated) DEVICE — KIT SURG 1.1OZ .25 COLON LUBE 2 END SEAL HYDRA ORCA

## (undated) DEVICE — GLOVE SURG 8 PROTEXIS LTX CRM NTR COAT PF BEAD CUFF STRL

## (undated) DEVICE — NEEDLE HPO 23GA 1.5IN BVL STRL LF DISP JELCO NDL-PRO EDGE